# Patient Record
Sex: FEMALE | Race: ASIAN | NOT HISPANIC OR LATINO
[De-identification: names, ages, dates, MRNs, and addresses within clinical notes are randomized per-mention and may not be internally consistent; named-entity substitution may affect disease eponyms.]

---

## 2022-11-03 ENCOUNTER — NON-APPOINTMENT (OUTPATIENT)
Age: 27
End: 2022-11-03

## 2022-11-11 ENCOUNTER — ASOB RESULT (OUTPATIENT)
Age: 27
End: 2022-11-11

## 2022-11-11 ENCOUNTER — APPOINTMENT (OUTPATIENT)
Dept: ANTEPARTUM | Facility: CLINIC | Age: 27
End: 2022-11-11

## 2022-11-11 PROCEDURE — 76813 OB US NUCHAL MEAS 1 GEST: CPT

## 2022-11-11 PROCEDURE — 36416 COLLJ CAPILLARY BLOOD SPEC: CPT

## 2022-11-11 PROCEDURE — 76801 OB US < 14 WKS SINGLE FETUS: CPT

## 2022-11-15 ENCOUNTER — APPOINTMENT (OUTPATIENT)
Dept: ANTEPARTUM | Facility: CLINIC | Age: 27
End: 2022-11-15

## 2022-11-17 PROBLEM — Z00.00 ENCOUNTER FOR PREVENTIVE HEALTH EXAMINATION: Status: ACTIVE | Noted: 2022-11-17

## 2022-11-30 ENCOUNTER — TRANSCRIPTION ENCOUNTER (OUTPATIENT)
Age: 27
End: 2022-11-30

## 2022-12-27 ENCOUNTER — APPOINTMENT (OUTPATIENT)
Dept: ENDOCRINOLOGY | Facility: CLINIC | Age: 27
End: 2022-12-27

## 2023-01-10 ENCOUNTER — ASOB RESULT (OUTPATIENT)
Age: 28
End: 2023-01-10

## 2023-01-10 ENCOUNTER — APPOINTMENT (OUTPATIENT)
Dept: ANTEPARTUM | Facility: CLINIC | Age: 28
End: 2023-01-10
Payer: COMMERCIAL

## 2023-01-10 PROCEDURE — 76811 OB US DETAILED SNGL FETUS: CPT

## 2023-05-08 ENCOUNTER — OUTPATIENT (OUTPATIENT)
Dept: INPATIENT UNIT | Facility: HOSPITAL | Age: 28
LOS: 1 days | Discharge: HOME | End: 2023-05-08
Payer: COMMERCIAL

## 2023-05-08 VITALS — SYSTOLIC BLOOD PRESSURE: 96 MMHG | HEART RATE: 94 BPM | DIASTOLIC BLOOD PRESSURE: 66 MMHG

## 2023-05-08 VITALS — TEMPERATURE: 99 F

## 2023-05-08 DIAGNOSIS — O26.899 OTHER SPECIFIED PREGNANCY RELATED CONDITIONS, UNSPECIFIED TRIMESTER: ICD-10-CM

## 2023-05-08 PROCEDURE — 99221 1ST HOSP IP/OBS SF/LOW 40: CPT

## 2023-05-08 NOTE — OB PROVIDER TRIAGE NOTE - NS_LMP_OBGYN_ALL_OB_DT
18-Aug-2022
I personally performed the service described in the documentation recorded by the scribe in my presence, and it accurately and completely records my words and actions.

## 2023-05-08 NOTE — OB RN TRIAGE NOTE - CHIEF COMPLAINT QUOTE
s/p R hip IM nail, debridement of surgical site
Sent from the doctor's office for variable decelerations , nonreactive NST and prolonged monitoring

## 2023-05-08 NOTE — OB PROVIDER TRIAGE NOTE - HISTORY OF PRESENT ILLNESS
29 yo , EGA@37 4/7 weeks, presented to D&T from her OB office for prolong monitoring due to variables down to 95 BPM, denies vaginal bleeding, leakage of fluid, and reports positive fetal movement.  Denies fever, chills, headaches, changes in vision, chest pain, palpitations, shortness of breath, cough, nausea, vomiting, diarrhea, constipation, urinary symptoms, edema.    Prenatal care with Dr. Flores  Prenatal course is complicated liver enzimes  GBS status is negative.  Meds:  Allergies:       29 yo , EGA@37 4/7 weeks, presented to D&T from her OB office for prolong monitoring due to variables down to 95 BPM, denies vaginal bleeding, leakage of fluid, and reports positive fetal movement.  Denies fever, chills, headaches, changes in vision, chest pain, palpitations, shortness of breath, cough, nausea, vomiting, diarrhea, constipation, urinary symptoms, edema.    Prenatal care with Dr. Flores  Prenatal course is complicated liver enzymes  GBS status is negative as per patient  Meds: PNV  Allergies: NKDA

## 2023-05-08 NOTE — OB PROVIDER TRIAGE NOTE - NSHPPHYSICALEXAM_GEN_ALL_CORE
Vital Signs Last 24 Hrs  T(C): 37 (08 May 2023 19:47), Max: 37.0 (08 May 2023 19:26)  T(F): 98.6 (08 May 2023 19:47), Max: 98.6 (08 May 2023 19:26)  HR: 83 (08 May 2023 19:47) (83 - 83)  BP: 105/66 (08 May 2023 19:47) (105/66 - 105/66)  BP(mean): --  RR: 16 (08 May 2023 19:47) (16 - 16)  SpO2: --  Gen: NAD  Head: NC/AT  Cardio: S1S2+, RRR  Resp: CTABL, no wheezing  Abdomen: Soft, NT/ND, BS+  Extremities: No LE edema bilaterally    NST and BPP done to assess fetal surveillance and results as follows:  NST-->FHR: 120 HR baseline, moderate variability, accelerations present, no decelerations, Category 1.  [place]: Contractions present, irregular,  TAUS: Vital Signs Last 24 Hrs  T(C): 37 (08 May 2023 19:47), Max: 37.0 (08 May 2023 19:26)  T(F): 98.6 (08 May 2023 19:47), Max: 98.6 (08 May 2023 19:26)  HR: 83 (08 May 2023 19:47) (83 - 83)  BP: 105/66 (08 May 2023 19:47) (105/66 - 105/66)  BP(mean): --  RR: 16 (08 May 2023 19:47) (16 - 16)  SpO2: --  Gen: NAD  Head: NC/AT  Cardio: S1S2+, RRR  Resp: CTABL, no wheezing  Abdomen: Soft, NT/ND, BS+  Extremities: No LE edema bilaterally    NST and BPP done to assess fetal surveillance and results as follows:  NST-->FHR: 120 HR baseline, moderate variability, accelerations present, no decelerations, Category 1.  [place]: Contractions present, irregular,  TAUS: Cephalic presentation, Anterior placenta, LEYLA 14.41 BPP 8/8  VE: 0.5/30/-3

## 2023-05-08 NOTE — OB PROVIDER TRIAGE NOTE - ADDITIONAL INSTRUCTIONS
please drink 1-2 litter of fluid per day  follow up with OB for the end of this weeks, call for an appointment.

## 2023-05-08 NOTE — OB PROVIDER TRIAGE NOTE - NSOBPROVIDERNOTE_OBGYN_ALL_OB_FT
27 yo , EGA@37 4/7 weeks Prolong monitoring for variables.   tracing reviewed with Dr. Rosas PGY-3  2150 discuss with Dr. Mittal  Pt to keep self very well hydrated   stable for discharge  Discharge patient home.  Labor precautions and fetal movements count were reviewed; if not in labor, will follow up with OB the end of this weeks, call for an appointment.  Plan of care was reviewed with patient and family; patient states understanding of the above plan. 27 yo , EGA@37 4/7 weeks Prolong monitoring for variables.   tracing reviewed with Dr. Rosas PGY-3   discuss with Dr. Mittal  Pt to keep self very well hydrated   stable for discharge  Discharge patient home.  Labor precautions and fetal movements count were reviewed; if not in labor, will follow up with OB the end of this weeks, call for an appointment.  Plan of care was reviewed with patient and family; patient states understanding of the above plan.  Pt Discharged Home @ 4119

## 2023-05-10 DIAGNOSIS — R74.8 ABNORMAL LEVELS OF OTHER SERUM ENZYMES: ICD-10-CM

## 2023-05-10 DIAGNOSIS — O36.8330 MATERNAL CARE FOR ABNORMALITIES OF THE FETAL HEART RATE OR RHYTHM, THIRD TRIMESTER, NOT APPLICABLE OR UNSPECIFIED: ICD-10-CM

## 2023-05-10 DIAGNOSIS — Z3A.37 37 WEEKS GESTATION OF PREGNANCY: ICD-10-CM

## 2023-05-10 DIAGNOSIS — O26.613 LIVER AND BILIARY TRACT DISORDERS IN PREGNANCY, THIRD TRIMESTER: ICD-10-CM

## 2023-05-11 DIAGNOSIS — O26.893 OTHER SPECIFIED PREGNANCY RELATED CONDITIONS, THIRD TRIMESTER: ICD-10-CM

## 2023-05-15 ENCOUNTER — INPATIENT (INPATIENT)
Facility: HOSPITAL | Age: 28
LOS: 3 days | Discharge: HOME | End: 2023-05-19
Attending: OBSTETRICS & GYNECOLOGY | Admitting: OBSTETRICS & GYNECOLOGY

## 2023-05-15 VITALS
DIASTOLIC BLOOD PRESSURE: 68 MMHG | TEMPERATURE: 97 F | SYSTOLIC BLOOD PRESSURE: 107 MMHG | RESPIRATION RATE: 16 BRPM | WEIGHT: 130.07 LBS | HEIGHT: 62 IN | HEART RATE: 109 BPM

## 2023-05-15 DIAGNOSIS — O09.90 SUPERVISION OF HIGH RISK PREGNANCY, UNSPECIFIED, UNSPECIFIED TRIMESTER: ICD-10-CM

## 2023-05-15 PROBLEM — Z78.9 OTHER SPECIFIED HEALTH STATUS: Chronic | Status: ACTIVE | Noted: 2023-05-08

## 2023-05-15 LAB
ALBUMIN SERPL ELPH-MCNC: 3.4 G/DL — SIGNIFICANT CHANGE UP (ref 3.3–5)
ALP SERPL-CCNC: 193 U/L — HIGH (ref 40–120)
ALT FLD-CCNC: 112 U/L — HIGH (ref 4–33)
ANION GAP SERPL CALC-SCNC: 13 MMOL/L — SIGNIFICANT CHANGE UP (ref 7–14)
APTT BLD: 29.6 SEC — SIGNIFICANT CHANGE UP (ref 27–36.3)
AST SERPL-CCNC: 45 U/L — HIGH (ref 4–32)
BASOPHILS # BLD AUTO: 0.06 K/UL — SIGNIFICANT CHANGE UP (ref 0–0.2)
BASOPHILS NFR BLD AUTO: 0.6 % — SIGNIFICANT CHANGE UP (ref 0–2)
BILIRUB SERPL-MCNC: <0.2 MG/DL — SIGNIFICANT CHANGE UP (ref 0.2–1.2)
BLD GP AB SCN SERPL QL: NEGATIVE — SIGNIFICANT CHANGE UP
BUN SERPL-MCNC: 11 MG/DL — SIGNIFICANT CHANGE UP (ref 7–23)
CALCIUM SERPL-MCNC: 9.2 MG/DL — SIGNIFICANT CHANGE UP (ref 8.4–10.5)
CHLORIDE SERPL-SCNC: 103 MMOL/L — SIGNIFICANT CHANGE UP (ref 98–107)
CO2 SERPL-SCNC: 20 MMOL/L — LOW (ref 22–31)
COVID-19 SPIKE DOMAIN AB INTERP: POSITIVE
COVID-19 SPIKE DOMAIN ANTIBODY RESULT: >250 U/ML — HIGH
CREAT SERPL-MCNC: 0.61 MG/DL — SIGNIFICANT CHANGE UP (ref 0.5–1.3)
EGFR: 126 ML/MIN/1.73M2 — SIGNIFICANT CHANGE UP
EOSINOPHIL # BLD AUTO: 0.1 K/UL — SIGNIFICANT CHANGE UP (ref 0–0.5)
EOSINOPHIL NFR BLD AUTO: 1.1 % — SIGNIFICANT CHANGE UP (ref 0–6)
GLUCOSE SERPL-MCNC: 89 MG/DL — SIGNIFICANT CHANGE UP (ref 70–99)
HCT VFR BLD CALC: 36.9 % — SIGNIFICANT CHANGE UP (ref 34.5–45)
HGB BLD-MCNC: 12 G/DL — SIGNIFICANT CHANGE UP (ref 11.5–15.5)
IANC: 6.03 K/UL — SIGNIFICANT CHANGE UP (ref 1.8–7.4)
IMM GRANULOCYTES NFR BLD AUTO: 1.1 % — HIGH (ref 0–0.9)
INR BLD: <0.9 RATIO — LOW (ref 0.88–1.16)
LDH SERPL L TO P-CCNC: 194 U/L — SIGNIFICANT CHANGE UP (ref 135–225)
LYMPHOCYTES # BLD AUTO: 2.29 K/UL — SIGNIFICANT CHANGE UP (ref 1–3.3)
LYMPHOCYTES # BLD AUTO: 24.7 % — SIGNIFICANT CHANGE UP (ref 13–44)
MCHC RBC-ENTMCNC: 29.9 PG — SIGNIFICANT CHANGE UP (ref 27–34)
MCHC RBC-ENTMCNC: 32.5 GM/DL — SIGNIFICANT CHANGE UP (ref 32–36)
MCV RBC AUTO: 91.8 FL — SIGNIFICANT CHANGE UP (ref 80–100)
MONOCYTES # BLD AUTO: 0.69 K/UL — SIGNIFICANT CHANGE UP (ref 0–0.9)
MONOCYTES NFR BLD AUTO: 7.4 % — SIGNIFICANT CHANGE UP (ref 2–14)
NEUTROPHILS # BLD AUTO: 6.03 K/UL — SIGNIFICANT CHANGE UP (ref 1.8–7.4)
NEUTROPHILS NFR BLD AUTO: 65.1 % — SIGNIFICANT CHANGE UP (ref 43–77)
NRBC # BLD: 0 /100 WBCS — SIGNIFICANT CHANGE UP (ref 0–0)
NRBC # FLD: 0 K/UL — SIGNIFICANT CHANGE UP (ref 0–0)
PLATELET # BLD AUTO: 210 K/UL — SIGNIFICANT CHANGE UP (ref 150–400)
POTASSIUM SERPL-MCNC: 4.1 MMOL/L — SIGNIFICANT CHANGE UP (ref 3.5–5.3)
POTASSIUM SERPL-SCNC: 4.1 MMOL/L — SIGNIFICANT CHANGE UP (ref 3.5–5.3)
PROT SERPL-MCNC: 6.2 G/DL — SIGNIFICANT CHANGE UP (ref 6–8.3)
PROTHROM AB SERPL-ACNC: 9.8 SEC — LOW (ref 10.5–13.4)
RBC # BLD: 4.02 M/UL — SIGNIFICANT CHANGE UP (ref 3.8–5.2)
RBC # FLD: 13 % — SIGNIFICANT CHANGE UP (ref 10.3–14.5)
RH IG SCN BLD-IMP: POSITIVE — SIGNIFICANT CHANGE UP
RH IG SCN BLD-IMP: POSITIVE — SIGNIFICANT CHANGE UP
SARS-COV-2 IGG+IGM SERPL QL IA: >250 U/ML — HIGH
SARS-COV-2 IGG+IGM SERPL QL IA: POSITIVE
SODIUM SERPL-SCNC: 136 MMOL/L — SIGNIFICANT CHANGE UP (ref 135–145)
T PALLIDUM AB TITR SER: NEGATIVE — SIGNIFICANT CHANGE UP
URATE SERPL-MCNC: 8.5 MG/DL — HIGH (ref 2.5–7)
WBC # BLD: 9.27 K/UL — SIGNIFICANT CHANGE UP (ref 3.8–10.5)
WBC # FLD AUTO: 9.27 K/UL — SIGNIFICANT CHANGE UP (ref 3.8–10.5)

## 2023-05-15 RX ORDER — SODIUM CHLORIDE 9 MG/ML
1000 INJECTION, SOLUTION INTRAVENOUS ONCE
Refills: 0 | Status: DISCONTINUED | OUTPATIENT
Start: 2023-05-15 | End: 2023-05-16

## 2023-05-15 RX ORDER — OXYTOCIN 10 UNIT/ML
333.33 VIAL (ML) INJECTION
Qty: 20 | Refills: 0 | Status: COMPLETED | OUTPATIENT
Start: 2023-05-15 | End: 2023-05-15

## 2023-05-15 RX ORDER — SODIUM CHLORIDE 9 MG/ML
1000 INJECTION, SOLUTION INTRAVENOUS
Refills: 0 | Status: DISCONTINUED | OUTPATIENT
Start: 2023-05-15 | End: 2023-05-15

## 2023-05-15 RX ORDER — SODIUM CHLORIDE 9 MG/ML
1000 INJECTION, SOLUTION INTRAVENOUS
Refills: 0 | Status: DISCONTINUED | OUTPATIENT
Start: 2023-05-15 | End: 2023-05-16

## 2023-05-15 RX ORDER — CHLORHEXIDINE GLUCONATE 213 G/1000ML
1 SOLUTION TOPICAL DAILY
Refills: 0 | Status: DISCONTINUED | OUTPATIENT
Start: 2023-05-15 | End: 2023-05-16

## 2023-05-15 RX ORDER — CHLORHEXIDINE GLUCONATE 213 G/1000ML
1 SOLUTION TOPICAL DAILY
Refills: 0 | Status: DISCONTINUED | OUTPATIENT
Start: 2023-05-15 | End: 2023-05-15

## 2023-05-15 RX ORDER — SODIUM CHLORIDE 9 MG/ML
1000 INJECTION, SOLUTION INTRAVENOUS ONCE
Refills: 0 | Status: DISCONTINUED | OUTPATIENT
Start: 2023-05-15 | End: 2023-05-15

## 2023-05-15 RX ADMIN — CHLORHEXIDINE GLUCONATE 1 APPLICATION(S): 213 SOLUTION TOPICAL at 10:50

## 2023-05-15 RX ADMIN — SODIUM CHLORIDE 125 MILLILITER(S): 9 INJECTION, SOLUTION INTRAVENOUS at 11:31

## 2023-05-15 NOTE — OB PROVIDER H&P - ASSESSMENT
A&P: This is a 29 y/o  at 38w4d gestation presenting for IOL due to Elevated Liver Enzymes of unknown etiology. Pregnancy otherwise uncomplicated.     -Admit to L&D  -Buccal cytotec, Cervical balloon when able and if patient will tolerate   -routine labs w/ HELLP labs   -EFM/toco  -NPO, IV hydration   Fetal: cat 1 tracing, fetal status reassuring   GBS: neg  Analgesia: Patient is open to epidural when needed.       Discussed with Dr. Sandra Retana PGY-2 A&P: This is a 27 y/o  at 38w3d gestation presenting for IOL due to Elevated Liver Enzymes of unknown etiology. Pregnancy otherwise uncomplicated.     -Admit to L&D  -Buccal cytotec, Cervical balloon when able and if patient will tolerate   -routine labs w/ HELLP labs   -EFM/toco  -NPO, IV hydration   Fetal: cat 1 tracing, fetal status reassuring   GBS: neg  Analgesia: Patient is open to epidural when needed.       Discussed with Dr. Sandra Retana PGY-2

## 2023-05-15 NOTE — OB RN PATIENT PROFILE - NS TRANSFER PATIENT BELONGINGS
Seizure precautions  Discussed febrile seizures and safety precautions with mother   Monitor fever curve     Cell Phone/PDA (specify)/Jewelry/Money (specify)/Clothing

## 2023-05-15 NOTE — OB PROVIDER H&P - HISTORY OF PRESENT ILLNESS
Pt is a 27 y/o  at 38w4d gestation being admitted for IOL due to elevated Liver enzymes of unknown etiology. Per MFM, Dr. Duarte patient is to undergo IOL.  Patient reports +FM, no LOF, No vaginal bleeding. Denies any chest pain, shortness of breath, visual changes, pruritis or headaches.   Prenatal course: Pregnancy complicated by Elevated LFTs of unknown etiology. Patient did have an abnormal FHT noted in triage last week with singular episode of fetal bradycardia.   GBS: Neg   EFW: 3100g (gross estimation based on previous viewable sonogram)     OBHx: Denies any history   GynHx: denies h/o abnormal paps, STI's, fibroids, cysts, No previous history of GYN surgeries   PMHx: Denies   PSHx: Denies   Med: Daily Prenatal vitamin   All: NKDA  SH: denies alcohol, tobacco, or drug use  Psych: denies h/o anxiety or depression      EFH: 120 bpm baseline HR, Moderate Variability, +Accels, no decelerations   [place]: Irregular Contractions   VE:0/0/-3   TAUS: Cephalic, Placenta posterior      Pt is a 29 y/o  at 38w3d gestation being admitted for IOL due to elevated Liver enzymes of unknown etiology. Per MFM, Dr. Duarte patient is to undergo IOL.  Patient reports +FM, no LOF, No vaginal bleeding. Denies any chest pain, shortness of breath, visual changes, pruritis or headaches.   Prenatal course: Pregnancy complicated by Elevated LFTs of unknown etiology. Patient did have an abnormal FHT noted in triage last week with singular episode of fetal bradycardia.   GBS: Neg   EFW: 3100g (gross estimation based on previous viewable sonogram)     OBHx: Denies any history   GynHx: denies h/o abnormal paps, STI's, fibroids, cysts, No previous history of GYN surgeries   PMHx: Denies   PSHx: Denies   Med: Daily Prenatal vitamin   All: NKDA  SH: denies alcohol, tobacco, or drug use  Psych: denies h/o anxiety or depression      EFH: 120 bpm baseline HR, Moderate Variability, +Accels, no decelerations   [place]: Irregular Contractions   VE:0/0/-3   TAUS: Cephalic, Placenta posterior

## 2023-05-16 ENCOUNTER — TRANSCRIPTION ENCOUNTER (OUTPATIENT)
Age: 28
End: 2023-05-16

## 2023-05-16 LAB
ALBUMIN SERPL ELPH-MCNC: 3.2 G/DL — LOW (ref 3.3–5)
ALBUMIN SERPL ELPH-MCNC: 3.3 G/DL — SIGNIFICANT CHANGE UP (ref 3.3–5)
ALP SERPL-CCNC: 195 U/L — HIGH (ref 40–120)
ALP SERPL-CCNC: 197 U/L — HIGH (ref 40–120)
ALT FLD-CCNC: 103 U/L — HIGH (ref 4–33)
ALT FLD-CCNC: 97 U/L — HIGH (ref 4–33)
ANION GAP SERPL CALC-SCNC: 14 MMOL/L — SIGNIFICANT CHANGE UP (ref 7–14)
ANION GAP SERPL CALC-SCNC: 14 MMOL/L — SIGNIFICANT CHANGE UP (ref 7–14)
APPEARANCE UR: CLEAR — SIGNIFICANT CHANGE UP
APTT BLD: 27.1 SEC — SIGNIFICANT CHANGE UP (ref 27–36.3)
APTT BLD: 28.2 SEC — SIGNIFICANT CHANGE UP (ref 27–36.3)
APTT BLD: 28.6 SEC — SIGNIFICANT CHANGE UP (ref 27–36.3)
AST SERPL-CCNC: 42 U/L — HIGH (ref 4–32)
AST SERPL-CCNC: 43 U/L — HIGH (ref 4–32)
BASOPHILS # BLD AUTO: 0.04 K/UL — SIGNIFICANT CHANGE UP (ref 0–0.2)
BASOPHILS NFR BLD AUTO: 0.2 % — SIGNIFICANT CHANGE UP (ref 0–2)
BASOPHILS NFR BLD AUTO: 0.3 % — SIGNIFICANT CHANGE UP (ref 0–2)
BASOPHILS NFR BLD AUTO: 0.4 % — SIGNIFICANT CHANGE UP (ref 0–2)
BILIRUB SERPL-MCNC: 0.3 MG/DL — SIGNIFICANT CHANGE UP (ref 0.2–1.2)
BILIRUB SERPL-MCNC: 0.3 MG/DL — SIGNIFICANT CHANGE UP (ref 0.2–1.2)
BILIRUB UR-MCNC: NEGATIVE — SIGNIFICANT CHANGE UP
BUN SERPL-MCNC: 7 MG/DL — SIGNIFICANT CHANGE UP (ref 7–23)
BUN SERPL-MCNC: 8 MG/DL — SIGNIFICANT CHANGE UP (ref 7–23)
CALCIUM SERPL-MCNC: 9.2 MG/DL — SIGNIFICANT CHANGE UP (ref 8.4–10.5)
CALCIUM SERPL-MCNC: 9.2 MG/DL — SIGNIFICANT CHANGE UP (ref 8.4–10.5)
CHLORIDE SERPL-SCNC: 106 MMOL/L — SIGNIFICANT CHANGE UP (ref 98–107)
CHLORIDE SERPL-SCNC: 107 MMOL/L — SIGNIFICANT CHANGE UP (ref 98–107)
CO2 SERPL-SCNC: 17 MMOL/L — LOW (ref 22–31)
CO2 SERPL-SCNC: 19 MMOL/L — LOW (ref 22–31)
COLOR SPEC: SIGNIFICANT CHANGE UP
CREAT ?TM UR-MCNC: 34 MG/DL — SIGNIFICANT CHANGE UP
CREAT SERPL-MCNC: 0.66 MG/DL — SIGNIFICANT CHANGE UP (ref 0.5–1.3)
CREAT SERPL-MCNC: 0.72 MG/DL — SIGNIFICANT CHANGE UP (ref 0.5–1.3)
DIFF PNL FLD: ABNORMAL
EGFR: 117 ML/MIN/1.73M2 — SIGNIFICANT CHANGE UP
EGFR: 123 ML/MIN/1.73M2 — SIGNIFICANT CHANGE UP
EOSINOPHIL # BLD AUTO: 0.01 K/UL — SIGNIFICANT CHANGE UP (ref 0–0.5)
EOSINOPHIL # BLD AUTO: 0.02 K/UL — SIGNIFICANT CHANGE UP (ref 0–0.5)
EOSINOPHIL # BLD AUTO: 0.11 K/UL — SIGNIFICANT CHANGE UP (ref 0–0.5)
EOSINOPHIL NFR BLD AUTO: 0.1 % — SIGNIFICANT CHANGE UP (ref 0–6)
EOSINOPHIL NFR BLD AUTO: 0.1 % — SIGNIFICANT CHANGE UP (ref 0–6)
EOSINOPHIL NFR BLD AUTO: 1 % — SIGNIFICANT CHANGE UP (ref 0–6)
FIBRINOGEN PPP-MCNC: 455 MG/DL — SIGNIFICANT CHANGE UP (ref 200–465)
FIBRINOGEN PPP-MCNC: 518 MG/DL — HIGH (ref 200–465)
GLUCOSE SERPL-MCNC: 69 MG/DL — LOW (ref 70–99)
GLUCOSE SERPL-MCNC: 75 MG/DL — SIGNIFICANT CHANGE UP (ref 70–99)
GLUCOSE UR QL: NEGATIVE — SIGNIFICANT CHANGE UP
HCT VFR BLD CALC: 34.9 % — SIGNIFICANT CHANGE UP (ref 34.5–45)
HCT VFR BLD CALC: 38.3 % — SIGNIFICANT CHANGE UP (ref 34.5–45)
HCT VFR BLD CALC: 40.2 % — SIGNIFICANT CHANGE UP (ref 34.5–45)
HGB BLD-MCNC: 11.4 G/DL — LOW (ref 11.5–15.5)
HGB BLD-MCNC: 12.5 G/DL — SIGNIFICANT CHANGE UP (ref 11.5–15.5)
HGB BLD-MCNC: 13.2 G/DL — SIGNIFICANT CHANGE UP (ref 11.5–15.5)
IANC: 11.42 K/UL — HIGH (ref 1.8–7.4)
IANC: 12.84 K/UL — HIGH (ref 1.8–7.4)
IANC: 7.22 K/UL — SIGNIFICANT CHANGE UP (ref 1.8–7.4)
IMM GRANULOCYTES NFR BLD AUTO: 0.4 % — SIGNIFICANT CHANGE UP (ref 0–0.9)
IMM GRANULOCYTES NFR BLD AUTO: 0.4 % — SIGNIFICANT CHANGE UP (ref 0–0.9)
IMM GRANULOCYTES NFR BLD AUTO: 0.8 % — SIGNIFICANT CHANGE UP (ref 0–0.9)
INR BLD: <0.9 RATIO — SIGNIFICANT CHANGE UP (ref 0.88–1.16)
INR BLD: <0.9 RATIO — SIGNIFICANT CHANGE UP (ref 0.88–1.16)
KETONES UR-MCNC: ABNORMAL
LDH SERPL L TO P-CCNC: 182 U/L — SIGNIFICANT CHANGE UP (ref 135–225)
LDH SERPL L TO P-CCNC: 189 U/L — SIGNIFICANT CHANGE UP (ref 135–225)
LEUKOCYTE ESTERASE UR-ACNC: NEGATIVE — SIGNIFICANT CHANGE UP
LYMPHOCYTES # BLD AUTO: 14.2 % — SIGNIFICANT CHANGE UP (ref 13–44)
LYMPHOCYTES # BLD AUTO: 14.9 % — SIGNIFICANT CHANGE UP (ref 13–44)
LYMPHOCYTES # BLD AUTO: 2.18 K/UL — SIGNIFICANT CHANGE UP (ref 1–3.3)
LYMPHOCYTES # BLD AUTO: 2.3 K/UL — SIGNIFICANT CHANGE UP (ref 1–3.3)
LYMPHOCYTES # BLD AUTO: 2.58 K/UL — SIGNIFICANT CHANGE UP (ref 1–3.3)
LYMPHOCYTES # BLD AUTO: 23.9 % — SIGNIFICANT CHANGE UP (ref 13–44)
MAGNESIUM SERPL-MCNC: 5.1 MG/DL — HIGH (ref 1.6–2.6)
MCHC RBC-ENTMCNC: 29.9 PG — SIGNIFICANT CHANGE UP (ref 27–34)
MCHC RBC-ENTMCNC: 30.8 PG — SIGNIFICANT CHANGE UP (ref 27–34)
MCHC RBC-ENTMCNC: 31.2 PG — SIGNIFICANT CHANGE UP (ref 27–34)
MCHC RBC-ENTMCNC: 32.6 GM/DL — SIGNIFICANT CHANGE UP (ref 32–36)
MCHC RBC-ENTMCNC: 32.7 GM/DL — SIGNIFICANT CHANGE UP (ref 32–36)
MCHC RBC-ENTMCNC: 32.8 GM/DL — SIGNIFICANT CHANGE UP (ref 32–36)
MCV RBC AUTO: 91.6 FL — SIGNIFICANT CHANGE UP (ref 80–100)
MCV RBC AUTO: 94.3 FL — SIGNIFICANT CHANGE UP (ref 80–100)
MCV RBC AUTO: 95 FL — SIGNIFICANT CHANGE UP (ref 80–100)
MONOCYTES # BLD AUTO: 0.8 K/UL — SIGNIFICANT CHANGE UP (ref 0–0.9)
MONOCYTES # BLD AUTO: 0.86 K/UL — SIGNIFICANT CHANGE UP (ref 0–0.9)
MONOCYTES # BLD AUTO: 0.9 K/UL — SIGNIFICANT CHANGE UP (ref 0–0.9)
MONOCYTES NFR BLD AUTO: 5.3 % — SIGNIFICANT CHANGE UP (ref 2–14)
MONOCYTES NFR BLD AUTO: 6.2 % — SIGNIFICANT CHANGE UP (ref 2–14)
MONOCYTES NFR BLD AUTO: 7.4 % — SIGNIFICANT CHANGE UP (ref 2–14)
NEUTROPHILS # BLD AUTO: 11.42 K/UL — HIGH (ref 1.8–7.4)
NEUTROPHILS # BLD AUTO: 12.84 K/UL — HIGH (ref 1.8–7.4)
NEUTROPHILS # BLD AUTO: 7.22 K/UL — SIGNIFICANT CHANGE UP (ref 1.8–7.4)
NEUTROPHILS NFR BLD AUTO: 66.9 % — SIGNIFICANT CHANGE UP (ref 43–77)
NEUTROPHILS NFR BLD AUTO: 78.1 % — HIGH (ref 43–77)
NEUTROPHILS NFR BLD AUTO: 79.4 % — HIGH (ref 43–77)
NITRITE UR-MCNC: NEGATIVE — SIGNIFICANT CHANGE UP
NRBC # BLD: 0 /100 WBCS — SIGNIFICANT CHANGE UP (ref 0–0)
NRBC # FLD: 0 K/UL — SIGNIFICANT CHANGE UP (ref 0–0)
PH UR: 6.5 — SIGNIFICANT CHANGE UP (ref 5–8)
PLATELET # BLD AUTO: 178 K/UL — SIGNIFICANT CHANGE UP (ref 150–400)
PLATELET # BLD AUTO: 194 K/UL — SIGNIFICANT CHANGE UP (ref 150–400)
PLATELET # BLD AUTO: 201 K/UL — SIGNIFICANT CHANGE UP (ref 150–400)
POTASSIUM SERPL-MCNC: 3.9 MMOL/L — SIGNIFICANT CHANGE UP (ref 3.5–5.3)
POTASSIUM SERPL-MCNC: 4 MMOL/L — SIGNIFICANT CHANGE UP (ref 3.5–5.3)
POTASSIUM SERPL-SCNC: 3.9 MMOL/L — SIGNIFICANT CHANGE UP (ref 3.5–5.3)
POTASSIUM SERPL-SCNC: 4 MMOL/L — SIGNIFICANT CHANGE UP (ref 3.5–5.3)
PROT ?TM UR-MCNC: 8 MG/DL — SIGNIFICANT CHANGE UP
PROT ?TM UR-MCNC: 8 MG/DL — SIGNIFICANT CHANGE UP
PROT SERPL-MCNC: 6 G/DL — SIGNIFICANT CHANGE UP (ref 6–8.3)
PROT SERPL-MCNC: 6.3 G/DL — SIGNIFICANT CHANGE UP (ref 6–8.3)
PROT UR-MCNC: NEGATIVE — SIGNIFICANT CHANGE UP
PROT/CREAT UR-RTO: 0.2 RATIO — SIGNIFICANT CHANGE UP (ref 0–0.2)
PROTHROM AB SERPL-ACNC: 10.2 SEC — LOW (ref 10.5–13.4)
PROTHROM AB SERPL-ACNC: 10.3 SEC — LOW (ref 10.5–13.4)
RBC # BLD: 3.81 M/UL — SIGNIFICANT CHANGE UP (ref 3.8–5.2)
RBC # BLD: 4.06 M/UL — SIGNIFICANT CHANGE UP (ref 3.8–5.2)
RBC # BLD: 4.23 M/UL — SIGNIFICANT CHANGE UP (ref 3.8–5.2)
RBC # FLD: 13 % — SIGNIFICANT CHANGE UP (ref 10.3–14.5)
RBC # FLD: 13 % — SIGNIFICANT CHANGE UP (ref 10.3–14.5)
RBC # FLD: 13.2 % — SIGNIFICANT CHANGE UP (ref 10.3–14.5)
SODIUM SERPL-SCNC: 138 MMOL/L — SIGNIFICANT CHANGE UP (ref 135–145)
SODIUM SERPL-SCNC: 139 MMOL/L — SIGNIFICANT CHANGE UP (ref 135–145)
SP GR SPEC: 1.01 — LOW (ref 1.01–1.05)
URATE SERPL-MCNC: 8.1 MG/DL — HIGH (ref 2.5–7)
URATE SERPL-MCNC: 8.1 MG/DL — HIGH (ref 2.5–7)
URATE SERPL-MCNC: 8.2 MG/DL — HIGH (ref 2.5–7)
UROBILINOGEN FLD QL: SIGNIFICANT CHANGE UP
WBC # BLD: 10.79 K/UL — HIGH (ref 3.8–10.5)
WBC # BLD: 14.62 K/UL — HIGH (ref 3.8–10.5)
WBC # BLD: 16.18 K/UL — HIGH (ref 3.8–10.5)
WBC # FLD AUTO: 10.79 K/UL — HIGH (ref 3.8–10.5)
WBC # FLD AUTO: 14.62 K/UL — HIGH (ref 3.8–10.5)
WBC # FLD AUTO: 16.18 K/UL — HIGH (ref 3.8–10.5)

## 2023-05-16 RX ORDER — PRAMOXINE HYDROCHLORIDE 150 MG/15G
1 AEROSOL, FOAM RECTAL EVERY 4 HOURS
Refills: 0 | Status: DISCONTINUED | OUTPATIENT
Start: 2023-05-16 | End: 2023-05-19

## 2023-05-16 RX ORDER — MAGNESIUM SULFATE 500 MG/ML
2 VIAL (ML) INJECTION
Qty: 40 | Refills: 0 | Status: DISCONTINUED | OUTPATIENT
Start: 2023-05-16 | End: 2023-05-16

## 2023-05-16 RX ORDER — ACETAMINOPHEN 500 MG
975 TABLET ORAL
Refills: 0 | Status: DISCONTINUED | OUTPATIENT
Start: 2023-05-16 | End: 2023-05-19

## 2023-05-16 RX ORDER — DIBUCAINE 1 %
1 OINTMENT (GRAM) RECTAL
Qty: 0 | Refills: 0 | DISCHARGE
Start: 2023-05-16

## 2023-05-16 RX ORDER — IBUPROFEN 200 MG
1 TABLET ORAL
Qty: 0 | Refills: 0 | DISCHARGE
Start: 2023-05-16

## 2023-05-16 RX ORDER — MAGNESIUM HYDROXIDE 400 MG/1
30 TABLET, CHEWABLE ORAL
Refills: 0 | Status: DISCONTINUED | OUTPATIENT
Start: 2023-05-16 | End: 2023-05-16

## 2023-05-16 RX ORDER — HYDRALAZINE HCL 50 MG
5 TABLET ORAL ONCE
Refills: 0 | Status: COMPLETED | OUTPATIENT
Start: 2023-05-16 | End: 2023-05-16

## 2023-05-16 RX ORDER — BENZOCAINE 10 %
1 GEL (GRAM) MUCOUS MEMBRANE EVERY 6 HOURS
Refills: 0 | Status: DISCONTINUED | OUTPATIENT
Start: 2023-05-16 | End: 2023-05-16

## 2023-05-16 RX ORDER — SODIUM CHLORIDE 9 MG/ML
300 INJECTION, SOLUTION INTRAVENOUS ONCE
Refills: 0 | Status: COMPLETED | OUTPATIENT
Start: 2023-05-16 | End: 2023-05-16

## 2023-05-16 RX ORDER — OXYTOCIN 10 UNIT/ML
2 VIAL (ML) INJECTION
Qty: 30 | Refills: 0 | Status: DISCONTINUED | OUTPATIENT
Start: 2023-05-16 | End: 2023-05-18

## 2023-05-16 RX ORDER — OXYTOCIN 10 UNIT/ML
16.67 VIAL (ML) INJECTION
Qty: 20 | Refills: 0 | Status: DISCONTINUED | OUTPATIENT
Start: 2023-05-16 | End: 2023-05-18

## 2023-05-16 RX ORDER — NIFEDIPINE 30 MG
30 TABLET, EXTENDED RELEASE 24 HR ORAL DAILY
Refills: 0 | Status: DISCONTINUED | OUTPATIENT
Start: 2023-05-16 | End: 2023-05-19

## 2023-05-16 RX ORDER — DIPHENHYDRAMINE HCL 50 MG
25 CAPSULE ORAL EVERY 6 HOURS
Refills: 0 | Status: DISCONTINUED | OUTPATIENT
Start: 2023-05-16 | End: 2023-05-16

## 2023-05-16 RX ORDER — OXYTOCIN 10 UNIT/ML
41.67 VIAL (ML) INJECTION
Qty: 20 | Refills: 0 | Status: DISCONTINUED | OUTPATIENT
Start: 2023-05-16 | End: 2023-05-16

## 2023-05-16 RX ORDER — AER TRAVELER 0.5 G/1
1 SOLUTION RECTAL; TOPICAL EVERY 4 HOURS
Refills: 0 | Status: DISCONTINUED | OUTPATIENT
Start: 2023-05-16 | End: 2023-05-16

## 2023-05-16 RX ORDER — IBUPROFEN 200 MG
600 TABLET ORAL EVERY 6 HOURS
Refills: 0 | Status: COMPLETED | OUTPATIENT
Start: 2023-05-16 | End: 2024-04-13

## 2023-05-16 RX ORDER — NIFEDIPINE 30 MG
10 TABLET, EXTENDED RELEASE 24 HR ORAL ONCE
Refills: 0 | Status: DISCONTINUED | OUTPATIENT
Start: 2023-05-16 | End: 2023-05-16

## 2023-05-16 RX ORDER — BENZOCAINE 10 %
1 GEL (GRAM) MUCOUS MEMBRANE EVERY 6 HOURS
Refills: 0 | Status: DISCONTINUED | OUTPATIENT
Start: 2023-05-16 | End: 2023-05-19

## 2023-05-16 RX ORDER — OXYCODONE HYDROCHLORIDE 5 MG/1
5 TABLET ORAL ONCE
Refills: 0 | Status: DISCONTINUED | OUTPATIENT
Start: 2023-05-16 | End: 2023-05-19

## 2023-05-16 RX ORDER — TETANUS TOXOID, REDUCED DIPHTHERIA TOXOID AND ACELLULAR PERTUSSIS VACCINE, ADSORBED 5; 2.5; 8; 8; 2.5 [IU]/.5ML; [IU]/.5ML; UG/.5ML; UG/.5ML; UG/.5ML
0.5 SUSPENSION INTRAMUSCULAR ONCE
Refills: 0 | Status: DISCONTINUED | OUTPATIENT
Start: 2023-05-16 | End: 2023-05-16

## 2023-05-16 RX ORDER — MAGNESIUM SULFATE 500 MG/ML
1 VIAL (ML) INJECTION
Qty: 40 | Refills: 0 | Status: DISCONTINUED | OUTPATIENT
Start: 2023-05-16 | End: 2023-05-17

## 2023-05-16 RX ORDER — DIBUCAINE 1 %
1 OINTMENT (GRAM) RECTAL EVERY 6 HOURS
Refills: 0 | Status: DISCONTINUED | OUTPATIENT
Start: 2023-05-16 | End: 2023-05-19

## 2023-05-16 RX ORDER — HYDROCORTISONE 1 %
1 OINTMENT (GRAM) TOPICAL EVERY 6 HOURS
Refills: 0 | Status: DISCONTINUED | OUTPATIENT
Start: 2023-05-16 | End: 2023-05-16

## 2023-05-16 RX ORDER — SIMETHICONE 80 MG/1
80 TABLET, CHEWABLE ORAL EVERY 4 HOURS
Refills: 0 | Status: DISCONTINUED | OUTPATIENT
Start: 2023-05-16 | End: 2023-05-16

## 2023-05-16 RX ORDER — SIMETHICONE 80 MG/1
80 TABLET, CHEWABLE ORAL EVERY 4 HOURS
Refills: 0 | Status: DISCONTINUED | OUTPATIENT
Start: 2023-05-16 | End: 2023-05-19

## 2023-05-16 RX ORDER — AER TRAVELER 0.5 G/1
1 SOLUTION RECTAL; TOPICAL EVERY 4 HOURS
Refills: 0 | Status: DISCONTINUED | OUTPATIENT
Start: 2023-05-16 | End: 2023-05-19

## 2023-05-16 RX ORDER — SODIUM CHLORIDE 9 MG/ML
3 INJECTION INTRAMUSCULAR; INTRAVENOUS; SUBCUTANEOUS EVERY 8 HOURS
Refills: 0 | Status: DISCONTINUED | OUTPATIENT
Start: 2023-05-16 | End: 2023-05-19

## 2023-05-16 RX ORDER — LANOLIN
1 OINTMENT (GRAM) TOPICAL EVERY 6 HOURS
Refills: 0 | Status: DISCONTINUED | OUTPATIENT
Start: 2023-05-16 | End: 2023-05-16

## 2023-05-16 RX ORDER — HYDROCORTISONE 1 %
1 OINTMENT (GRAM) TOPICAL EVERY 6 HOURS
Refills: 0 | Status: DISCONTINUED | OUTPATIENT
Start: 2023-05-16 | End: 2023-05-19

## 2023-05-16 RX ORDER — MAGNESIUM HYDROXIDE 400 MG/1
30 TABLET, CHEWABLE ORAL
Refills: 0 | Status: DISCONTINUED | OUTPATIENT
Start: 2023-05-16 | End: 2023-05-19

## 2023-05-16 RX ORDER — KETOROLAC TROMETHAMINE 30 MG/ML
30 SYRINGE (ML) INJECTION ONCE
Refills: 0 | Status: DISCONTINUED | OUTPATIENT
Start: 2023-05-16 | End: 2023-05-16

## 2023-05-16 RX ORDER — DIPHENHYDRAMINE HCL 50 MG
25 CAPSULE ORAL EVERY 6 HOURS
Refills: 0 | Status: DISCONTINUED | OUTPATIENT
Start: 2023-05-16 | End: 2023-05-19

## 2023-05-16 RX ORDER — AER TRAVELER 0.5 G/1
1 SOLUTION RECTAL; TOPICAL
Qty: 0 | Refills: 0 | DISCHARGE
Start: 2023-05-16

## 2023-05-16 RX ORDER — SODIUM CHLORIDE 9 MG/ML
3 INJECTION INTRAMUSCULAR; INTRAVENOUS; SUBCUTANEOUS EVERY 8 HOURS
Refills: 0 | Status: DISCONTINUED | OUTPATIENT
Start: 2023-05-16 | End: 2023-05-16

## 2023-05-16 RX ORDER — ACETAMINOPHEN 500 MG
3 TABLET ORAL
Qty: 0 | Refills: 0 | DISCHARGE
Start: 2023-05-16

## 2023-05-16 RX ORDER — SODIUM CHLORIDE 9 MG/ML
1000 INJECTION, SOLUTION INTRAVENOUS
Refills: 0 | Status: DISCONTINUED | OUTPATIENT
Start: 2023-05-16 | End: 2023-05-18

## 2023-05-16 RX ORDER — LANOLIN
1 OINTMENT (GRAM) TOPICAL EVERY 6 HOURS
Refills: 0 | Status: DISCONTINUED | OUTPATIENT
Start: 2023-05-16 | End: 2023-05-19

## 2023-05-16 RX ORDER — PRAMOXINE HYDROCHLORIDE 150 MG/15G
1 AEROSOL, FOAM RECTAL EVERY 4 HOURS
Refills: 0 | Status: DISCONTINUED | OUTPATIENT
Start: 2023-05-16 | End: 2023-05-16

## 2023-05-16 RX ORDER — DIBUCAINE 1 %
1 OINTMENT (GRAM) RECTAL EVERY 6 HOURS
Refills: 0 | Status: DISCONTINUED | OUTPATIENT
Start: 2023-05-16 | End: 2023-05-16

## 2023-05-16 RX ORDER — OXYCODONE HYDROCHLORIDE 5 MG/1
5 TABLET ORAL
Refills: 0 | Status: DISCONTINUED | OUTPATIENT
Start: 2023-05-16 | End: 2023-05-19

## 2023-05-16 RX ORDER — MAGNESIUM SULFATE 500 MG/ML
4 VIAL (ML) INJECTION ONCE
Refills: 0 | Status: COMPLETED | OUTPATIENT
Start: 2023-05-16 | End: 2023-05-16

## 2023-05-16 RX ORDER — TETANUS TOXOID, REDUCED DIPHTHERIA TOXOID AND ACELLULAR PERTUSSIS VACCINE, ADSORBED 5; 2.5; 8; 8; 2.5 [IU]/.5ML; [IU]/.5ML; UG/.5ML; UG/.5ML; UG/.5ML
0.5 SUSPENSION INTRAMUSCULAR ONCE
Refills: 0 | Status: DISCONTINUED | OUTPATIENT
Start: 2023-05-16 | End: 2023-05-19

## 2023-05-16 RX ADMIN — Medication 125 MILLIUNIT(S)/MIN: at 12:31

## 2023-05-16 RX ADMIN — SODIUM CHLORIDE 75 MILLILITER(S): 9 INJECTION, SOLUTION INTRAVENOUS at 17:46

## 2023-05-16 RX ADMIN — SODIUM CHLORIDE 50 MILLILITER(S): 9 INJECTION, SOLUTION INTRAVENOUS at 16:31

## 2023-05-16 RX ADMIN — Medication 30 MILLIGRAM(S): at 15:43

## 2023-05-16 RX ADMIN — Medication 25 GM/HR: at 17:46

## 2023-05-16 RX ADMIN — Medication 975 MILLIGRAM(S): at 14:07

## 2023-05-16 RX ADMIN — Medication 5 MILLIGRAM(S): at 14:30

## 2023-05-16 RX ADMIN — Medication 50 MILLIUNIT(S)/MIN: at 14:24

## 2023-05-16 RX ADMIN — SODIUM CHLORIDE 3 MILLILITER(S): 9 INJECTION INTRAMUSCULAR; INTRAVENOUS; SUBCUTANEOUS at 21:54

## 2023-05-16 RX ADMIN — Medication 25 GM/HR: at 19:10

## 2023-05-16 RX ADMIN — SODIUM CHLORIDE 125 MILLILITER(S): 9 INJECTION, SOLUTION INTRAVENOUS at 07:08

## 2023-05-16 RX ADMIN — Medication 30 MILLIGRAM(S): at 14:10

## 2023-05-16 RX ADMIN — SODIUM CHLORIDE 3 MILLILITER(S): 9 INJECTION INTRAMUSCULAR; INTRAVENOUS; SUBCUTANEOUS at 14:30

## 2023-05-16 RX ADMIN — Medication 25 GM/HR: at 21:16

## 2023-05-16 RX ADMIN — Medication 50 GM/HR: at 14:51

## 2023-05-16 RX ADMIN — CHLORHEXIDINE GLUCONATE 1 APPLICATION(S): 213 SOLUTION TOPICAL at 06:08

## 2023-05-16 RX ADMIN — Medication 1000 MILLIUNIT(S)/MIN: at 12:13

## 2023-05-16 RX ADMIN — Medication 30 MILLIGRAM(S): at 14:30

## 2023-05-16 RX ADMIN — Medication 300 GRAM(S): at 14:23

## 2023-05-16 RX ADMIN — Medication 975 MILLIGRAM(S): at 14:30

## 2023-05-16 RX ADMIN — Medication 2 MILLIUNIT(S)/MIN: at 10:38

## 2023-05-16 RX ADMIN — SODIUM CHLORIDE 75 MILLILITER(S): 9 INJECTION, SOLUTION INTRAVENOUS at 19:10

## 2023-05-16 RX ADMIN — SODIUM CHLORIDE 300 MILLILITER(S): 9 INJECTION, SOLUTION INTRAVENOUS at 17:28

## 2023-05-16 NOTE — OB RN DELIVERY SUMMARY - NSSELHIDDEN_OBGYN_ALL_OB_FT
[NS_DeliveryAttending1_OBGYN_ALL_OB_FT:[id number]],[NS_DeliveryAssist1_OBGYN_ALL_OB_FT:[id number]],[NS_DeliveryRN_OBGYN_ALL_OB_FT:[id number]=]

## 2023-05-16 NOTE — DISCHARGE NOTE OB - MEDICATION SUMMARY - MEDICATIONS TO TAKE
I will START or STAY ON the medications listed below when I get home from the hospital:    ibuprofen 600 mg oral tablet  -- 1 tab(s) by mouth every 6 hours as needed for mild pain  -- Indication: For pain    acetaminophen 325 mg oral tablet  -- 3 tab(s) by mouth every 6 hours as needed for  mild pain  -- Indication: For pain    NIFEdipine 30 mg oral tablet, extended release  -- 1 tab(s) by mouth once a day as needed for Preeclampsia HOLD for BP less than 110/60, HR less than 60  -- Indication: For preeclampsia    dibucaine 1% topical ointment  -- 1 Apply on skin to affected area every 6 hours As needed Perineal discomfort  -- Indication: For vaginal delivery    witch hazel 50% topical pad  -- 1 Apply on skin to affected area every 4 hours As needed Perineal discomfort  -- Indication: For vaginal delivery   I will START or STAY ON the medications listed below when I get home from the hospital:    ibuprofen 600 mg oral tablet  -- 1 tab(s) by mouth every 6 hours as needed for mild pain  -- Indication: For pain    acetaminophen 325 mg oral tablet  -- 3 tab(s) by mouth every 6 hours as needed for  mild pain  -- Indication: For pain    valACYclovir 1 g oral tablet  -- 1 tab(s) by mouth every 12 hours  -- Indication: For hsv    NIFEdipine 30 mg oral tablet, extended release  -- 1 tab(s) by mouth once a day as needed for Preeclampsia HOLD for BP less than 110/60, HR less than 60  -- Indication: For preeclampsia    dibucaine 1% topical ointment  -- 1 Apply on skin to affected area every 6 hours As needed Perineal discomfort  -- Indication: For vaginal delivery    witch hazel 50% topical pad  -- 1 Apply on skin to affected area every 4 hours As needed Perineal discomfort  -- Indication: For vaginal delivery    docosanol 10% topical cream  -- 1 Apply on skin to affected area 5 times a day  -- Indication: For HSV

## 2023-05-16 NOTE — OB RN DELIVERY SUMMARY - NS_SEPSISRSKCALC_OBGYN_ALL_OB_FT
EOS calculated successfully. EOS Risk Factor: 0.5/1000 live births ([facility] national incidence); GA=38w4d; Temp=99.5; ROM=3.65; GBS='Negative'; Antibiotics='No antibiotics or any antibiotics < 2 hrs prior to birth'

## 2023-05-16 NOTE — DISCHARGE NOTE OB - PATIENT PORTAL LINK FT
You can access the FollowMyHealth Patient Portal offered by [facility] by registering at the following website: [email or web address]. By joining [employer]’s FollowMyHealth portal, you will also be able to view your health information using other applications (apps) compatible with our system.

## 2023-05-16 NOTE — OB PROVIDER DELIVERY SUMMARY - NSPROVIDERDELIVERYNOTE_OBGYN_ALL_OB_FT
Spontaneous vaginal delivery of liveborn female infant from REGINA position. Head, shoulders, and body delivered easily. Infant was suctioned. No mec. Delayed cord clamping and infant was passed to mother. Cord clamped and cut. Placenta delivered intact with a 3 vessel cord. Fundal massage was given and uterine fundus was found to be firm. Vaginal exam revealed an intact cervix, vaginal walls and sulci. Patient had a  first degree laceration in the perineum that was repaired with 2.0 chromic suture. Small left periurethral laceration repaired with 3.0 vicryl suture, straight catheterization performed to ensure urethral patency (100cc UOP). Excellent hemostasis was noted. Patient was stable and went to recovery. Count was correct x 2.    Apgars 9/9, infant weight 3030g    Delivery with attending, Dr. Sandra Dubois PGY1

## 2023-05-16 NOTE — DISCHARGE NOTE OB - CARE PROVIDER_API CALL
Deya Flores (DO)  Obstetrics and Gynecology  [address] [place] [address]  Phone: [phone]  Follow Up Time:

## 2023-05-16 NOTE — DISCHARGE NOTE OB - ADDITIONAL INSTRUCTIONS
Follow up @ [facility] office in 5-6wks for postpartum visit Follow up @ [facility] office in 3 days for BP check

## 2023-05-16 NOTE — OB PROVIDER LABOR PROGRESS NOTE - NS_SUBJECTIVE/OBJECTIVE_OBGYN_ALL_OB_FT
Patient seen and examined    FHTs CAT1 with moderate variability  cx: 5/890/0  AROM for clear fluid    38,5 IOL for elevated LFTs  -will order epi/pit
PGY2 Labor Note    Patient seen and evaluated at bedside.  Denies complaints.   T(C): 37.0 (05-16-23 @ 03:30), Max: 37.4 (05-15-23 @ 21:30)  HR: 51 (05-16-23 @ 04:51) (51 - 109)  BP: 108/66 (05-16-23 @ 04:51) (95/58 - 126/73)  RR: 16 (05-16-23 @ 03:30) (15 - 18)  SpO2: --
R1 Labor & Delivery Progress Note     Pt seen & examined at bedside for repeat VE.    T(C): 36.8 (05-16-23 @ 09:58), Max: 37.4 (05-15-23 @ 21:30)  HR: 58 (05-16-23 @ 10:29) (51 - 109)  BP: 142/60 (05-16-23 @ 10:23) (95/58 - 171/71)  RR: 16 (05-16-23 @ 08:51) (15 - 18)  SpO2: 95% (05-16-23 @ 10:29) (86% - 99%)
R1 Labor & Delivery Progress Note     Pt seen & examined at bedside for evaluation of cervical change. Pt comfortable without epidural.    T(C): 36.9 (05-15-23 @ 15:04), Max: 37.0 (05-15-23 @ 13:02)  HR: 56 (05-15-23 @ 15:05) (56 - 109)  BP: 110/69 (05-15-23 @ 15:05) (104/66 - 110/69)  RR: 16 (05-15-23 @ 15:04) (15 - 16)

## 2023-05-16 NOTE — OB POSTPARTUM EVENT NOTE - NS_EVENTFINDINGS1_OBGYN_ALL_OB_FT
magnesium 4g administered at 1423.  magnesium 2g administered at 1450.  pitocin infusing at 50cc/hr  hydralazine 5mg IVP given at 1430.  patient reports pain relief 0/10 with IV toradol and tylenol.  magnesium level q6h  lungs and reflexes q2h    patient informed of plan of care and verbalizes understanding. all questions answered. understanding verbalizes. patient educated on severe pre-eclampsia, s/s of pre-eclampsia, and s/s of a seizure. indication and potential side effects of magnesium and hydralazine discussed. verbal understanding obtained at this time.

## 2023-05-16 NOTE — DISCHARGE NOTE OB - NS MD DC FALL RISK RISK
For information on Fall & Injury Prevention, visit: [email or web address] OR  [email or web address] OR  https://www.cdc.gov/steadi/patient.html

## 2023-05-16 NOTE — OB NEONATOLOGY/PEDIATRICIAN DELIVERY SUMMARY - NSPEDSNEONOTESA_OBGYN_ALL_OB_FT
Baby is a 38.4 wk GA female born to a 27 y/o  mother via , IOL for elevated LFTs of unknown etiology. PEDS called to delivery for cat II tracing. Maternal history above. Prenatal history uncomplicated. Maternal blood type O+. PNL negative, non-reactive, and immune. GBS negative on . AROM at 0831 on , clear fluids. Baby born vigorous and crying spontaneously. Warmed, dried, stimulated. Apgars 9/9. EOS 0.12. Mom plans to breastfeed/bottlefeed and consents hepB.  BW: 3030 g  : 23  TOB: 12:10    Physical Exam:  Gen: NAD, +grimace  HEENT: anterior fontanel open soft and flat, no cleft lip/palate, ears normal set, no ear pits or tags. no lesions in mouth/throat, nares clinically patent  Resp: no increased work of breathing, good air entry b/l, clear to auscultation bilaterally  Cardio: normal S1/S2, regular rate and rhythm, no murmur appreciated  Abd: soft, non tender, non distended, + bowel sounds, umbilical cord with 3 vessels  Back: +shallow sacral dimple with base, spine midline, no tuft of hair  Neuro: +grasp/suck/payton/palmar/plantar, normal tone  Extremities: negative singleton and ortolani, moving all extremities, full range of motion x 4, no crepitus  Skin: pink, warm  Genitals: Normal female anatomy, [place] 1, anus patent

## 2023-05-16 NOTE — OB PROVIDER DELIVERY SUMMARY - NSSELHIDDEN_OBGYN_ALL_OB_FT
[NS_DeliveryAttending1_OBGYN_ALL_OB_FT:[id number]],[NS_DeliveryAssist1_OBGYN_ALL_OB_FT:[id number]],[NS_DeliveryRN_OBGYN_ALL_OB_FT:[id number]]

## 2023-05-16 NOTE — OB POSTPARTUM EVENT NOTE - NS_EVENTSUMMARY1_OBGYN_ALL_OB_FT
(late charting due to patient care) patient calls RN to bedside for severe headache scale 7/10. patient had  at 1210 IOL for elevated LFTs with unknown etiology. patient reports trouble breathing with oxygen saturation WNL. patient sits high fowlers with improvement in breathing. MD Dubois at bedside. 975 mg PO tylenol given at 1407 and 30mg IV tylenol given at 1410. 2 minutes later, patient calls RN to bedside with headache 10/10, crying in pain, and shaking uncontrollably. MD Dubois reports back to bedside. BP noted to be 187/84. labile pressures noted after delivery but no severes until 1410. HR 60s-70s. plan fo care discussed with MD Flores. Patient to receive magnesium for seizure prophylaxis. repeat blood pressure 164/84 with HR 60s. hydralazine 5mg IVP ordered and given due to patient profusely vomiting.

## 2023-05-16 NOTE — OB PROVIDER DELIVERY SUMMARY - NSLOWPPHRISK_OBGYN_A_OB
No previous uterine incision/[place] Pregnancy/Less than or equal to 4 previous vaginal births/No known bleeding disorder/No history of postpartum hemorrhage/No other PPH risks indicated

## 2023-05-16 NOTE — OB PROVIDER LABOR PROGRESS NOTE - ASSESSMENT
Plan: 28y y/o  @38w3d in stable condition  - Patient has received 2 doses of buccal cytotec and is making cervical change, now /-3 from 0/3  - Will defer placement of cervical balloon at this time to evaluate if pt continues to make change on buccal cytotec alone  - Con't IOL with next dose of buccal cytotec at 625p  - Continuous EFM, [place]  - Con't IVF    d/w attending physician Dr. Sandra Dubois PGY1
Plan: 28y y/o  @38w4d in stable condition  - Con't IOL with transition to Pitocin  - Continuous EFM, [place]  - Con't IVF    d/w attending physician Dr. Sandra Dubois PGY1
A/P: 28y P0 admitted for IOL 2/2 elevated LFTs  - c/w BC   - continuous monitoring/toco   - epi for pian   - maternal repositioning/fluids prn for resuscitation     Anne Benitez PGY2

## 2023-05-17 LAB
ALBUMIN SERPL ELPH-MCNC: 3.1 G/DL — LOW (ref 3.3–5)
ALP SERPL-CCNC: 166 U/L — HIGH (ref 40–120)
ALT FLD-CCNC: 80 U/L — HIGH (ref 4–33)
ANION GAP SERPL CALC-SCNC: 13 MMOL/L — SIGNIFICANT CHANGE UP (ref 7–14)
APTT BLD: 27.1 SEC — SIGNIFICANT CHANGE UP (ref 27–36.3)
AST SERPL-CCNC: 40 U/L — HIGH (ref 4–32)
BASOPHILS # BLD AUTO: 0.06 K/UL — SIGNIFICANT CHANGE UP (ref 0–0.2)
BASOPHILS NFR BLD AUTO: 0.3 % — SIGNIFICANT CHANGE UP (ref 0–2)
BILIRUB SERPL-MCNC: 0.3 MG/DL — SIGNIFICANT CHANGE UP (ref 0.2–1.2)
BUN SERPL-MCNC: 6 MG/DL — LOW (ref 7–23)
CALCIUM SERPL-MCNC: 8 MG/DL — LOW (ref 8.4–10.5)
CHLORIDE SERPL-SCNC: 103 MMOL/L — SIGNIFICANT CHANGE UP (ref 98–107)
CO2 SERPL-SCNC: 22 MMOL/L — SIGNIFICANT CHANGE UP (ref 22–31)
CREAT SERPL-MCNC: 0.76 MG/DL — SIGNIFICANT CHANGE UP (ref 0.5–1.3)
EGFR: 110 ML/MIN/1.73M2 — SIGNIFICANT CHANGE UP
EOSINOPHIL # BLD AUTO: 0.02 K/UL — SIGNIFICANT CHANGE UP (ref 0–0.5)
EOSINOPHIL NFR BLD AUTO: 0.1 % — SIGNIFICANT CHANGE UP (ref 0–6)
FIBRINOGEN PPP-MCNC: 478 MG/DL — HIGH (ref 200–465)
GLUCOSE SERPL-MCNC: 96 MG/DL — SIGNIFICANT CHANGE UP (ref 70–99)
HCT VFR BLD CALC: 34.5 % — SIGNIFICANT CHANGE UP (ref 34.5–45)
HGB BLD-MCNC: 11.5 G/DL — SIGNIFICANT CHANGE UP (ref 11.5–15.5)
IANC: 16.07 K/UL — HIGH (ref 1.8–7.4)
IMM GRANULOCYTES NFR BLD AUTO: 0.6 % — SIGNIFICANT CHANGE UP (ref 0–0.9)
INR BLD: <0.9 RATIO — LOW (ref 0.88–1.16)
LDH SERPL L TO P-CCNC: 217 U/L — SIGNIFICANT CHANGE UP (ref 135–225)
LYMPHOCYTES # BLD AUTO: 13.9 % — SIGNIFICANT CHANGE UP (ref 13–44)
LYMPHOCYTES # BLD AUTO: 2.85 K/UL — SIGNIFICANT CHANGE UP (ref 1–3.3)
MAGNESIUM SERPL-MCNC: 4.8 MG/DL — HIGH (ref 1.6–2.6)
MAGNESIUM SERPL-MCNC: 4.8 MG/DL — HIGH (ref 1.6–2.6)
MCHC RBC-ENTMCNC: 30.7 PG — SIGNIFICANT CHANGE UP (ref 27–34)
MCHC RBC-ENTMCNC: 33.3 GM/DL — SIGNIFICANT CHANGE UP (ref 32–36)
MCV RBC AUTO: 92 FL — SIGNIFICANT CHANGE UP (ref 80–100)
MONOCYTES # BLD AUTO: 1.4 K/UL — HIGH (ref 0–0.9)
MONOCYTES NFR BLD AUTO: 6.8 % — SIGNIFICANT CHANGE UP (ref 2–14)
NEUTROPHILS # BLD AUTO: 16.07 K/UL — HIGH (ref 1.8–7.4)
NEUTROPHILS NFR BLD AUTO: 78.3 % — HIGH (ref 43–77)
NRBC # BLD: 0 /100 WBCS — SIGNIFICANT CHANGE UP (ref 0–0)
NRBC # FLD: 0 K/UL — SIGNIFICANT CHANGE UP (ref 0–0)
PLATELET # BLD AUTO: 199 K/UL — SIGNIFICANT CHANGE UP (ref 150–400)
POTASSIUM SERPL-MCNC: 3.7 MMOL/L — SIGNIFICANT CHANGE UP (ref 3.5–5.3)
POTASSIUM SERPL-SCNC: 3.7 MMOL/L — SIGNIFICANT CHANGE UP (ref 3.5–5.3)
PROT SERPL-MCNC: 5.8 G/DL — LOW (ref 6–8.3)
PROTHROM AB SERPL-ACNC: 10 SEC — LOW (ref 10.5–13.4)
RBC # BLD: 3.75 M/UL — LOW (ref 3.8–5.2)
RBC # FLD: 13.2 % — SIGNIFICANT CHANGE UP (ref 10.3–14.5)
SODIUM SERPL-SCNC: 138 MMOL/L — SIGNIFICANT CHANGE UP (ref 135–145)
URATE SERPL-MCNC: 8.6 MG/DL — HIGH (ref 2.5–7)
WBC # BLD: 20.52 K/UL — HIGH (ref 3.8–10.5)
WBC # FLD AUTO: 20.52 K/UL — HIGH (ref 3.8–10.5)

## 2023-05-17 RX ORDER — IBUPROFEN 200 MG
600 TABLET ORAL EVERY 6 HOURS
Refills: 0 | Status: DISCONTINUED | OUTPATIENT
Start: 2023-05-17 | End: 2023-05-19

## 2023-05-17 RX ADMIN — Medication 975 MILLIGRAM(S): at 21:07

## 2023-05-17 RX ADMIN — Medication 975 MILLIGRAM(S): at 21:40

## 2023-05-17 RX ADMIN — SODIUM CHLORIDE 3 MILLILITER(S): 9 INJECTION INTRAMUSCULAR; INTRAVENOUS; SUBCUTANEOUS at 05:24

## 2023-05-17 RX ADMIN — Medication 1 TABLET(S): at 12:12

## 2023-05-17 RX ADMIN — Medication 25 GM/HR: at 07:19

## 2023-05-17 RX ADMIN — Medication 600 MILLIGRAM(S): at 12:48

## 2023-05-17 RX ADMIN — Medication 975 MILLIGRAM(S): at 09:00

## 2023-05-17 RX ADMIN — SODIUM CHLORIDE 3 MILLILITER(S): 9 INJECTION INTRAMUSCULAR; INTRAVENOUS; SUBCUTANEOUS at 21:40

## 2023-05-17 RX ADMIN — Medication 600 MILLIGRAM(S): at 12:12

## 2023-05-17 RX ADMIN — Medication 975 MILLIGRAM(S): at 08:08

## 2023-05-18 LAB
ALBUMIN SERPL ELPH-MCNC: 3.5 G/DL — SIGNIFICANT CHANGE UP (ref 3.3–5)
ALP SERPL-CCNC: 160 U/L — HIGH (ref 40–120)
ALT FLD-CCNC: 72 U/L — HIGH (ref 4–33)
ANION GAP SERPL CALC-SCNC: 13 MMOL/L — SIGNIFICANT CHANGE UP (ref 7–14)
APTT BLD: 28.4 SEC — SIGNIFICANT CHANGE UP (ref 27–36.3)
AST SERPL-CCNC: 33 U/L — HIGH (ref 4–32)
BASOPHILS # BLD AUTO: 0.07 K/UL — SIGNIFICANT CHANGE UP (ref 0–0.2)
BASOPHILS NFR BLD AUTO: 0.3 % — SIGNIFICANT CHANGE UP (ref 0–2)
BILIRUB SERPL-MCNC: 0.2 MG/DL — SIGNIFICANT CHANGE UP (ref 0.2–1.2)
BUN SERPL-MCNC: 8 MG/DL — SIGNIFICANT CHANGE UP (ref 7–23)
CALCIUM SERPL-MCNC: 9.1 MG/DL — SIGNIFICANT CHANGE UP (ref 8.4–10.5)
CHLORIDE SERPL-SCNC: 104 MMOL/L — SIGNIFICANT CHANGE UP (ref 98–107)
CO2 SERPL-SCNC: 22 MMOL/L — SIGNIFICANT CHANGE UP (ref 22–31)
CREAT SERPL-MCNC: 0.69 MG/DL — SIGNIFICANT CHANGE UP (ref 0.5–1.3)
EGFR: 122 ML/MIN/1.73M2 — SIGNIFICANT CHANGE UP
EOSINOPHIL # BLD AUTO: 0.11 K/UL — SIGNIFICANT CHANGE UP (ref 0–0.5)
EOSINOPHIL NFR BLD AUTO: 0.5 % — SIGNIFICANT CHANGE UP (ref 0–6)
FIBRINOGEN PPP-MCNC: 633 MG/DL — HIGH (ref 200–465)
GLUCOSE SERPL-MCNC: 71 MG/DL — SIGNIFICANT CHANGE UP (ref 70–99)
HCT VFR BLD CALC: 37.5 % — SIGNIFICANT CHANGE UP (ref 34.5–45)
HGB BLD-MCNC: 12.3 G/DL — SIGNIFICANT CHANGE UP (ref 11.5–15.5)
IANC: 16.9 K/UL — HIGH (ref 1.8–7.4)
IMM GRANULOCYTES NFR BLD AUTO: 0.7 % — SIGNIFICANT CHANGE UP (ref 0–0.9)
INR BLD: <0.9 RATIO — LOW (ref 0.88–1.16)
LDH SERPL L TO P-CCNC: 214 U/L — SIGNIFICANT CHANGE UP (ref 135–225)
LYMPHOCYTES # BLD AUTO: 13.6 % — SIGNIFICANT CHANGE UP (ref 13–44)
LYMPHOCYTES # BLD AUTO: 2.91 K/UL — SIGNIFICANT CHANGE UP (ref 1–3.3)
MCHC RBC-ENTMCNC: 31.1 PG — SIGNIFICANT CHANGE UP (ref 27–34)
MCHC RBC-ENTMCNC: 32.8 GM/DL — SIGNIFICANT CHANGE UP (ref 32–36)
MCV RBC AUTO: 94.9 FL — SIGNIFICANT CHANGE UP (ref 80–100)
MONOCYTES # BLD AUTO: 1.25 K/UL — HIGH (ref 0–0.9)
MONOCYTES NFR BLD AUTO: 5.8 % — SIGNIFICANT CHANGE UP (ref 2–14)
NEUTROPHILS # BLD AUTO: 16.9 K/UL — HIGH (ref 1.8–7.4)
NEUTROPHILS NFR BLD AUTO: 79.1 % — HIGH (ref 43–77)
NRBC # BLD: 0 /100 WBCS — SIGNIFICANT CHANGE UP (ref 0–0)
NRBC # FLD: 0 K/UL — SIGNIFICANT CHANGE UP (ref 0–0)
PLATELET # BLD AUTO: 238 K/UL — SIGNIFICANT CHANGE UP (ref 150–400)
POTASSIUM SERPL-MCNC: 4.1 MMOL/L — SIGNIFICANT CHANGE UP (ref 3.5–5.3)
POTASSIUM SERPL-SCNC: 4.1 MMOL/L — SIGNIFICANT CHANGE UP (ref 3.5–5.3)
PROT SERPL-MCNC: 6.3 G/DL — SIGNIFICANT CHANGE UP (ref 6–8.3)
PROTHROM AB SERPL-ACNC: 9.7 SEC — LOW (ref 10.5–13.4)
RBC # BLD: 3.95 M/UL — SIGNIFICANT CHANGE UP (ref 3.8–5.2)
RBC # FLD: 13.2 % — SIGNIFICANT CHANGE UP (ref 10.3–14.5)
SODIUM SERPL-SCNC: 139 MMOL/L — SIGNIFICANT CHANGE UP (ref 135–145)
URATE SERPL-MCNC: 8.3 MG/DL — HIGH (ref 2.5–7)
WBC # BLD: 21.39 K/UL — HIGH (ref 3.8–10.5)
WBC # FLD AUTO: 21.39 K/UL — HIGH (ref 3.8–10.5)

## 2023-05-18 RX ORDER — ERTAPENEM SODIUM 1 G/1
1000 INJECTION, POWDER, LYOPHILIZED, FOR SOLUTION INTRAMUSCULAR; INTRAVENOUS ONCE
Refills: 0 | Status: DISCONTINUED | OUTPATIENT
Start: 2023-05-18 | End: 2023-05-18

## 2023-05-18 RX ORDER — ERTAPENEM SODIUM 1 G/1
1000 INJECTION, POWDER, LYOPHILIZED, FOR SOLUTION INTRAMUSCULAR; INTRAVENOUS EVERY 24 HOURS
Refills: 0 | Status: DISCONTINUED | OUTPATIENT
Start: 2023-05-18 | End: 2023-05-19

## 2023-05-18 RX ORDER — NIFEDIPINE 30 MG
1 TABLET, EXTENDED RELEASE 24 HR ORAL
Qty: 0 | Refills: 0 | DISCHARGE
Start: 2023-05-18

## 2023-05-18 RX ADMIN — Medication 975 MILLIGRAM(S): at 21:25

## 2023-05-18 RX ADMIN — Medication 975 MILLIGRAM(S): at 20:25

## 2023-05-18 RX ADMIN — PRAMOXINE HYDROCHLORIDE 1 APPLICATION(S): 150 AEROSOL, FOAM RECTAL at 20:29

## 2023-05-18 RX ADMIN — SODIUM CHLORIDE 3 MILLILITER(S): 9 INJECTION INTRAMUSCULAR; INTRAVENOUS; SUBCUTANEOUS at 22:24

## 2023-05-18 RX ADMIN — Medication 1 APPLICATION(S): at 20:29

## 2023-05-18 RX ADMIN — ERTAPENEM SODIUM 120 MILLIGRAM(S): 1 INJECTION, POWDER, LYOPHILIZED, FOR SOLUTION INTRAMUSCULAR; INTRAVENOUS at 15:39

## 2023-05-18 NOTE — CHART NOTE - NSCHARTNOTEFT_GEN_A_CORE
Called by RN in regards to patient having had discrepancies in her orthostatic blood pressures. Standing patient had systolic BPs in the 110s, and sitting had systolic blood pressures in the 80s. The patient started to become hypotensive upon starting her magnesium infusion. She was symptomatic and was having increased dizziness.     Patient was seen and examined at bedside for further evaluation. She does report dizziness and states that she thinks it is due to her poor PO intake today. Denies any headaches, weakness, nausea, vomiting, RUQ pain and no visual changes.       General: sitting up in bed in mild distress due to headache pain  Cardiovascular: regular rate and rhythm  Respiratory: normal respiratory effort, lungs clear to auscultation b/l without wheeze or crackles  Neuro: AAO X 3, Sensation intact. PERRLA, No nystagmus noted.   Extremities: no pitting edema in lower extremities    PLAN:  -Continue Magnesium Infusion, will decrease overall rate to 1g/hr in conjunction with LR @75cc/hr   -Will continue to monitor for magnesium toxicity   -Continue to monitor blood pressure closely     Discussed with Attending Dr. Sandra Retana PGY2
Patient is PPD2 from     CBC reviewed: WBC noted to be increasing  WBC 16.8>>20.5>>21.3    LABS:  cret                        12.3   21.39 )-----------( 238      ( 18 May 2023 06:10 )             37.5         139  |  104  |  8   ----------------------------<  71  4.1   |  22  |  0.69    Ca    9.1      18 May 2023 06:10  Mg     4.80         TPro  6.3  /  Alb  3.5  /  TBili  0.2  /  DBili  x   /  AST  33<H>  /  ALT  72<H>  /  AlkPhos  160<H>      PT/INR - ( 18 May 2023 06:10 )   PT: 9.7 sec;   INR: <0.90 ratio         PTT - ( 18 May 2023 06:10 )  PTT:28.4 sec      On exam, patient reports no tenderness  Vaginal bleeding noted WNL, changing pads every 4hrs, not fully saturated pads  Uterus is firm and 2 FB below umbilicus
Patient seen at bedside for headache /10 in severity that started minutes ago. Pt is several hours s/p , uncomplicated. Denies vision changes, nausea, vomiting, RUQ pain, epigastric pain.     BP on evaluation: 145/85, repeat /84    General: sitting up in bed in mild distress due to headache pain  Cardiovascular: regular rate and rhythm  Respiratory: normal respiratory effort, lungs clear to auscultation b/l without wheeze or crackles  Extremities: no pitting edema in lower extremities    PLAN:  - Tylenol and Toradol now  - Patient meeting criteria for preeclampsia with severe features by symptoms (headache)  - Start Mg bolus for seizure prophylaxis  - F/u 15min repeat BP, no immediate release antihypertensive at this time    Discussed with attending, Dr. Sandra Key PGY1
Patient seen at bedside to discuss placement of cervical balloon. Mechanism of the cervical balloon discussed, all questions answered. Pt just received second dose of buccal cytotec at 325p. Pt requesting 1 additional hour prior to repeat VE and placement of cervical balloon.    Yesi PGY1

## 2023-05-19 VITALS
DIASTOLIC BLOOD PRESSURE: 68 MMHG | HEART RATE: 91 BPM | TEMPERATURE: 98 F | SYSTOLIC BLOOD PRESSURE: 103 MMHG | RESPIRATION RATE: 18 BRPM | OXYGEN SATURATION: 100 %

## 2023-05-19 DIAGNOSIS — B00.1 HERPESVIRAL VESICULAR DERMATITIS: ICD-10-CM

## 2023-05-19 DIAGNOSIS — O14.90 UNSPECIFIED PRE-ECLAMPSIA, UNSPECIFIED TRIMESTER: ICD-10-CM

## 2023-05-19 LAB
BASOPHILS # BLD AUTO: 0.05 K/UL — SIGNIFICANT CHANGE UP (ref 0–0.2)
BASOPHILS NFR BLD AUTO: 0.3 % — SIGNIFICANT CHANGE UP (ref 0–2)
EOSINOPHIL # BLD AUTO: 0.19 K/UL — SIGNIFICANT CHANGE UP (ref 0–0.5)
EOSINOPHIL NFR BLD AUTO: 1.1 % — SIGNIFICANT CHANGE UP (ref 0–6)
HCT VFR BLD CALC: 37.9 % — SIGNIFICANT CHANGE UP (ref 34.5–45)
HGB BLD-MCNC: 12.4 G/DL — SIGNIFICANT CHANGE UP (ref 11.5–15.5)
IANC: 13.1 K/UL — HIGH (ref 1.8–7.4)
IMM GRANULOCYTES NFR BLD AUTO: 0.5 % — SIGNIFICANT CHANGE UP (ref 0–0.9)
LYMPHOCYTES # BLD AUTO: 17.6 % — SIGNIFICANT CHANGE UP (ref 13–44)
LYMPHOCYTES # BLD AUTO: 3.09 K/UL — SIGNIFICANT CHANGE UP (ref 1–3.3)
MCHC RBC-ENTMCNC: 30.5 PG — SIGNIFICANT CHANGE UP (ref 27–34)
MCHC RBC-ENTMCNC: 32.7 GM/DL — SIGNIFICANT CHANGE UP (ref 32–36)
MCV RBC AUTO: 93.3 FL — SIGNIFICANT CHANGE UP (ref 80–100)
MONOCYTES # BLD AUTO: 1.08 K/UL — HIGH (ref 0–0.9)
MONOCYTES NFR BLD AUTO: 6.1 % — SIGNIFICANT CHANGE UP (ref 2–14)
NEUTROPHILS # BLD AUTO: 13.1 K/UL — HIGH (ref 1.8–7.4)
NEUTROPHILS NFR BLD AUTO: 74.4 % — SIGNIFICANT CHANGE UP (ref 43–77)
NRBC # BLD: 0 /100 WBCS — SIGNIFICANT CHANGE UP (ref 0–0)
NRBC # FLD: 0 K/UL — SIGNIFICANT CHANGE UP (ref 0–0)
PLATELET # BLD AUTO: 289 K/UL — SIGNIFICANT CHANGE UP (ref 150–400)
RBC # BLD: 4.06 M/UL — SIGNIFICANT CHANGE UP (ref 3.8–5.2)
RBC # FLD: 13.2 % — SIGNIFICANT CHANGE UP (ref 10.3–14.5)
WBC # BLD: 17.6 K/UL — HIGH (ref 3.8–10.5)
WBC # FLD AUTO: 17.6 K/UL — HIGH (ref 3.8–10.5)

## 2023-05-19 RX ORDER — DOCOSANOL 100 MG/G
1 CREAM TOPICAL
Refills: 0 | Status: DISCONTINUED | OUTPATIENT
Start: 2023-05-19 | End: 2023-05-19

## 2023-05-19 RX ORDER — VALACYCLOVIR 500 MG/1
1000 TABLET, FILM COATED ORAL EVERY 12 HOURS
Refills: 0 | Status: DISCONTINUED | OUTPATIENT
Start: 2023-05-19 | End: 2023-05-19

## 2023-05-19 RX ORDER — VALACYCLOVIR 500 MG/1
1 TABLET, FILM COATED ORAL
Qty: 0 | Refills: 0 | DISCHARGE
Start: 2023-05-19

## 2023-05-19 RX ORDER — DOCOSANOL 100 MG/G
1 CREAM TOPICAL
Qty: 0 | Refills: 0 | DISCHARGE
Start: 2023-05-19

## 2023-05-19 RX ADMIN — SODIUM CHLORIDE 3 MILLILITER(S): 9 INJECTION INTRAMUSCULAR; INTRAVENOUS; SUBCUTANEOUS at 06:50

## 2023-05-19 RX ADMIN — Medication 600 MILLIGRAM(S): at 12:05

## 2023-05-19 RX ADMIN — VALACYCLOVIR 1000 MILLIGRAM(S): 500 TABLET, FILM COATED ORAL at 12:04

## 2023-05-19 RX ADMIN — Medication 1 TABLET(S): at 12:06

## 2023-05-19 RX ADMIN — Medication 600 MILLIGRAM(S): at 13:00

## 2023-05-19 RX ADMIN — DOCOSANOL 1 APPLICATION(S): 100 CREAM TOPICAL at 12:05

## 2023-05-19 NOTE — PROGRESS NOTE ADULT - SUBJECTIVE AND OBJECTIVE BOX
ANESTHESIA POST-EPIDURAL CHECK    28y Female s/p  DAY 1     No COMPLAINTS    NO APPARENT ANESTHESIA COMPLICATIONS      
OB Progress Note:  PPD#1    S: 27yo  PPD#1 s/p  with immediate postpartum course c/b development of preeclampsia with severe features. Currently asymptomatic, denies headache, vision changes, lightheadedness, CP, SOB, RUQ pain, epigastric pain, N/V. Patient feels well. Pain is well controlled. She is tolerating a regular diet and passing flatus. She is voiding spontaneously, and ambulating with assistance while on Mg.    O:  Vitals:  Vital Signs Last 24 Hrs  T(C): 36.9 (17 May 2023 05:30), Max: 37.5 (16 May 2023 12:00)  T(F): 98.5 (17 May 2023 05:30), Max: 99.5 (16 May 2023 12:00)  HR: 93 (17 May 2023 05:30) (51 - 112)  BP: 97/51 (17 May 2023 05:30) (88/49 - 187/84)  RR: 16 (17 May 2023 05:30) (15 - 16)  SpO2: 98% (17 May 2023 05:30) (86% - 100%)    Parameters below as of 17 May 2023 05:30  Patient On (Oxygen Delivery Method): room air    MEDICATIONS  (STANDING):  acetaminophen     Tablet .. 975 milliGRAM(s) Oral <User Schedule>  diphtheria/tetanus/pertussis (acellular) Vaccine (Adacel) 0.5 milliLiter(s) IntraMuscular once  ibuprofen  Tablet. 600 milliGRAM(s) Oral every 6 hours  lactated ringers. 1000 milliLiter(s) (75 mL/Hr) IV Continuous <Continuous>  magnesium sulfate Infusion 1 Gm/Hr (25 mL/Hr) IV Continuous <Continuous>  NIFEdipine XL 30 milliGRAM(s) Oral daily  oxytocin Infusion 16.667 milliUNIT(s)/Min (50 mL/Hr) IV Continuous <Continuous>  oxytocin Infusion. 2 milliUNIT(s)/Min (2 mL/Hr) IV Continuous <Continuous>  prenatal multivitamin 1 Tablet(s) Oral daily  sodium chloride 0.9% lock flush 3 milliLiter(s) IV Push every 8 hours    Labs:  Blood type: O Positive  Rubella IgG: RPR: Negative                          11.4<L>   16.18<H> >-----------< 194    ( 05-16 @ 20:40 )             34.9                        13.2   14.62<H> >-----------< 201    (  @ 13:00 )             40.2                        12.5   10.79<H> >-----------< 178    (  @ 07:45 )             38.3                        12.0   9.27 >-----------< 210    ( 05-15 @ 11:30 )             36.9    23 @ 13:00      139  |  106  |  8   ----------------------------<  75  4.0   |  19<L>  |  0.72    23 @ 05:51      138  |  107  |  7   ----------------------------<  69<L>  3.9   |  17<L>  |  0.66    05-15-23 @ 11:30      136  |  103  |  11  ----------------------------<  89  4.1   |  20<L>  |  0.61        Ca    9.2      16 May 2023 13:00  Ca    9.2      16 May 2023 05:51  Ca    9.2      15 May 2023 11:30  Mg     4.80<H>       Mg     5.10<H>         TPro  6.0  /  Alb  3.3  /  TBili  0.3  /  DBili  x   /  AST  43<H>  /  ALT  97<H>  /  AlkPhos  197<H>  23 @ 13:00  TPro  6.3  /  Alb  3.2<L>  /  TBili  0.3  /  DBili  x   /  AST  42<H>  /  ALT  103<H>  /  AlkPhos  195<H>  23 @ 05:51  TPro  6.2  /  Alb  3.4  /  TBili  <0.2  /  DBili  x   /  AST  45<H>  /  ALT  112<H>  /  AlkPhos  193<H>  05-15-23 @ 11:30    Physical Exam:  General: NAD  Abdomen: soft, non-tender, non-distended, fundus firm  Vaginal: Lochia wnl  Extremities: No erythema/edema
OB Progress Note:  PPD#2    S: 27yo PPD#2 s/p  with immediate postpartum course c/b development of preeclampsia with severe features. Currently asymptomatic, denies headache, vision changes, lightheadedness, CP, SOB, RUQ pain, epigastric pain, N/V. Patient feels well. Pain is well controlled. She is tolerating a regular diet and passing flatus. She is voiding spontaneously, and ambulating without issues.     O:  Vitals:  Vital Signs Last 24 Hrs  T(C): 36.9 (18 May 2023 06:36), Max: 37.1 (17 May 2023 22:01)  T(F): 98.5 (18 May 2023 06:36), Max: 98.7 (17 May 2023 22:01)  HR: 62 (18 May 2023 06:36) (62 - 101)  BP: 109/71 (18 May 2023 06:36) (97/60 - 109/71)  RR: 18 (18 May 2023 06:36) (16 - 18)  SpO2: 99% (18 May 2023 06:36) (98% - 100%)    Parameters below as of 18 May 2023 06:36  Patient On (Oxygen Delivery Method): room air    MEDICATIONS  (STANDING):  acetaminophen     Tablet .. 975 milliGRAM(s) Oral <User Schedule>  diphtheria/tetanus/pertussis (acellular) Vaccine (Adacel) 0.5 milliLiter(s) IntraMuscular once  ibuprofen  Tablet. 600 milliGRAM(s) Oral every 6 hours  lactated ringers. 1000 milliLiter(s) (75 mL/Hr) IV Continuous <Continuous>  magnesium sulfate Infusion 1 Gm/Hr (25 mL/Hr) IV Continuous <Continuous>  NIFEdipine XL 30 milliGRAM(s) Oral daily  oxytocin Infusion 16.667 milliUNIT(s)/Min (50 mL/Hr) IV Continuous <Continuous>  oxytocin Infusion. 2 milliUNIT(s)/Min (2 mL/Hr) IV Continuous <Continuous>  prenatal multivitamin 1 Tablet(s) Oral daily  sodium chloride 0.9% lock flush 3 milliLiter(s) IV Push every 8 hours    Labs:  Blood type: O Positive  Rubella IgG: RPR: Negative                          11.4<L>   16.18<H> >-----------< 194    ( 05-16 @ 20:40 )             34.9                        13.2   14.62<H> >-----------< 201    (  @ 13:00 )             40.2                        12.5   10.79<H> >-----------< 178    (  @ 07:45 )             38.3                        12.0   9.27 >-----------< 210    ( 05-15 @ 11:30 )             36.9    23 @ 13:00      139  |  106  |  8   ----------------------------<  75  4.0   |  19<L>  |  0.72    23 @ 05:51      138  |  107  |  7   ----------------------------<  69<L>  3.9   |  17<L>  |  0.66    05-15-23 @ 11:30      136  |  103  |  11  ----------------------------<  89  4.1   |  20<L>  |  0.61        Ca    9.2      16 May 2023 13:00  Ca    9.2      16 May 2023 05:51  Ca    9.2      15 May 2023 11:30  Mg     4.80<H>       Mg     5.10<H>         TPro  6.0  /  Alb  3.3  /  TBili  0.3  /  DBili  x   /  AST  43<H>  /  ALT  97<H>  /  AlkPhos  197<H>  23 @ 13:00  TPro  6.3  /  Alb  3.2<L>  /  TBili  0.3  /  DBili  x   /  AST  42<H>  /  ALT  103<H>  /  AlkPhos  195<H>  23 @ 05:51  TPro  6.2  /  Alb  3.4  /  TBili  <0.2  /  DBili  x   /  AST  45<H>  /  ALT  112<H>  /  AlkPhos  193<H>  05-15-23 @ 11:30    Physical Exam:  General: NAD  Abdomen: soft, non-tender, non-distended, fundus firm  Vaginal: Lochia wnl  Extremities: No erythema/edema
OB Progress Note:  PPD#3    S: Patient feels well. Pain is well controlled. She is tolerating a regular diet and passing flatus. She is voiding spontaneously, and ambulating without difficulty. Denies CP/SOB. Denies HA/lightheadedness/dizziness. Denies N/V. Denies calf pain.    O:  Vitals:   Vital Signs Last 24 Hrs  T(C): 36.9 (19 May 2023 09:44), Max: 37.2 (18 May 2023 18:15)  T(F): 98.4 (19 May 2023 09:44), Max: 98.9 (18 May 2023 18:15)  HR: 80 (19 May 2023 09:44) (60 - 96)  BP: 94/68 (19 May 2023 09:44) (94/68 - 119/74)  BP(mean): --  RR: 16 (19 May 2023 09:44) (16 - 18)  SpO2: 100% (19 May 2023 09:44) (99% - 100%)    Parameters below as of 19 May 2023 09:44  Patient On (Oxygen Delivery Method): room air        Physical Exam:  General: NAD  Head: erythematous clustered vesicular lesion present on upper R lip   Abdomen: soft, non-tender, non-distended, fundus firm  Vaginal: lochia wnl  Extremities: No erythema/calf tenderness        MEDICATIONS  (STANDING):  acetaminophen     Tablet .. 975 milliGRAM(s) Oral <User Schedule>  diphtheria/tetanus/pertussis (acellular) Vaccine (Adacel) 0.5 milliLiter(s) IntraMuscular once  docosanol 10% Cream 1 Application(s) Topical five times a day  ertapenem  IVPB 1000 milliGRAM(s) IV Intermittent every 24 hours  ibuprofen  Tablet. 600 milliGRAM(s) Oral every 6 hours  NIFEdipine XL 30 milliGRAM(s) Oral daily  prenatal multivitamin 1 Tablet(s) Oral daily  sodium chloride 0.9% lock flush 3 milliLiter(s) IV Push every 8 hours  valACYclovir 1000 milliGRAM(s) Oral every 12 hours    MEDICATIONS  (PRN):  benzocaine 20%/menthol 0.5% Spray 1 Spray(s) Topical every 6 hours PRN for Perineal discomfort  dibucaine 1% Ointment 1 Application(s) Topical every 6 hours PRN Perineal discomfort  diphenhydrAMINE 25 milliGRAM(s) Oral every 6 hours PRN Pruritus  hydrocortisone 1% Cream 1 Application(s) Topical every 6 hours PRN Moderate Pain (4-6)  lanolin Ointment 1 Application(s) Topical every 6 hours PRN nipple soreness  magnesium hydroxide Suspension 30 milliLiter(s) Oral two times a day PRN Constipation  oxyCODONE    IR 5 milliGRAM(s) Oral every 3 hours PRN Moderate to Severe Pain (4-10)  oxyCODONE    IR 5 milliGRAM(s) Oral once PRN Moderate to Severe Pain (4-10)  pramoxine 1%/zinc 5% Cream 1 Application(s) Topical every 4 hours PRN Moderate Pain (4-6)  simethicone 80 milliGRAM(s) Chew every 4 hours PRN Gas  witch hazel Pads 1 Application(s) Topical every 4 hours PRN Perineal discomfort      Labs:  Blood type: O Positive  Rubella IgG: RPR: Negative                          12.4   17.60<H> >-----------< 289    (  @ 06:15 )             37.9                        12.3   21.39<H> >-----------< 238    (  @ 06:10 )             37.5                        11.5   20.52<H> >-----------< 199    (  @ 08:38 )             34.5                        11.4<L>   16.18<H> >-----------< 194    (  @ 20:40 )             34.9                        13.2   14.62<H> >-----------< 201    (  @ 13:00 )             40.2    23 @ 06:10      139  |  104  |  8   ----------------------------<  71  4.1   |  22  |  0.69    23 @ 08:38      138  |  103  |  6<L>  ----------------------------<  96  3.7   |  22  |  0.76    23 @ 13:00      139  |  106  |  8   ----------------------------<  75  4.0   |  19<L>  |  0.72        Ca    9.1      18 May 2023 06:10  Ca    8.0<L>      17 May 2023 08:38  Ca    9.2      16 May 2023 13:00  Mg     4.80<H>       Mg     4.80<H>       Mg     5.10<H>         TPro  6.3  /  Alb  3.5  /  TBili  0.2  /  DBili  x   /  AST  33<H>  /  ALT  72<H>  /  AlkPhos  160<H>  23 @ 06:10  TPro  5.8<L>  /  Alb  3.1<L>  /  TBili  0.3  /  DBili  x   /  AST  40<H>  /  ALT  80<H>  /  AlkPhos  166<H>  23 @ 08:38  TPro  6.0  /  Alb  3.3  /  TBili  0.3  /  DBili  x   /  AST  43<H>  /  ALT  97<H>  /  AlkPhos  197<H>  23 @ 13:00          
Post-partum Note,   She is a  28y woman who is now post-partum day: 3    Subjective:  The patient feels well.  She is ambulating.   She is tolerating regular diet.  She denies nausea and vomiting; denies fever.  She is voiding.  Her pain is controlled.  She reports normal postpartum bleeding.  She is breastfeeding and formula feeding.  She denies HA, blurry vision, epigastric pain, SOB, CP, dizziness or lightheadedness    Physical exam:    Vital Signs Last 24 Hrs  T(C): 36.9 (19 May 2023 09:44), Max: 37.2 (18 May 2023 18:15)  T(F): 98.4 (19 May 2023 09:44), Max: 98.9 (18 May 2023 18:15)  HR: 80 (19 May 2023 09:44) (60 - 96)  BP: 94/68 (19 May 2023 09:44) (94/68 - 119/74)  BP(mean): --  RR: 16 (19 May 2023 09:44) (16 - 18)  SpO2: 100% (19 May 2023 09:44) (99% - 100%)    Parameters below as of 19 May 2023 09:44  Patient On (Oxygen Delivery Method): room air        Gen: NAD  Breast: Soft, nontender, not engorged.  Abdomen: Soft, nontender, no distension , firm uterine fundus at umbilicus.  Pelvic: Normal lochia noted  Ext: No calf tenderness    LABS:                        12.4   17.60 )-----------( 289      ( 19 May 2023 06:15 )             37.9       Allergies    No Known Allergies        MEDICATIONS  (STANDING):  acetaminophen     Tablet .. 975 milliGRAM(s) Oral <User Schedule>  diphtheria/tetanus/pertussis (acellular) Vaccine (Adacel) 0.5 milliLiter(s) IntraMuscular once  docosanol 10% Cream 1 Application(s) Topical five times a day  ertapenem  IVPB 1000 milliGRAM(s) IV Intermittent every 24 hours  ibuprofen  Tablet. 600 milliGRAM(s) Oral every 6 hours  NIFEdipine XL 30 milliGRAM(s) Oral daily  prenatal multivitamin 1 Tablet(s) Oral daily  sodium chloride 0.9% lock flush 3 milliLiter(s) IV Push every 8 hours  valACYclovir 1000 milliGRAM(s) Oral every 12 hours    MEDICATIONS  (PRN):  benzocaine 20%/menthol 0.5% Spray 1 Spray(s) Topical every 6 hours PRN for Perineal discomfort  dibucaine 1% Ointment 1 Application(s) Topical every 6 hours PRN Perineal discomfort  diphenhydrAMINE 25 milliGRAM(s) Oral every 6 hours PRN Pruritus  hydrocortisone 1% Cream 1 Application(s) Topical every 6 hours PRN Moderate Pain (4-6)  lanolin Ointment 1 Application(s) Topical every 6 hours PRN nipple soreness  magnesium hydroxide Suspension 30 milliLiter(s) Oral two times a day PRN Constipation  oxyCODONE    IR 5 milliGRAM(s) Oral every 3 hours PRN Moderate to Severe Pain (4-10)  oxyCODONE    IR 5 milliGRAM(s) Oral once PRN Moderate to Severe Pain (4-10)  pramoxine 1%/zinc 5% Cream 1 Application(s) Topical every 4 hours PRN Moderate Pain (4-6)  simethicone 80 milliGRAM(s) Chew every 4 hours PRN Gas  witch hazel Pads 1 Application(s) Topical every 4 hours PRN Perineal discomfort

## 2023-05-19 NOTE — PROGRESS NOTE ADULT - ASSESSMENT
A/P: 27yo PPD#2 s/p  with immediate postpartum course c/b development of preeclampsia with severe features. Patient was initially induced for transaminitis of unknown origin with no hypertensive disorder diagnosed in her prenatal course. Patient is stable and doing well post-partum.     #Preeclampsia with severe features  - BPs overnight wnl  - AST/ALT: 45/112->42/103  - S/p Hydralazine 5mg IVP ()  - Continue Procardia 30XL daily  - S/p Mg for seizure prophylaxis    #PP care  - Pain well controlled, continue current pain regimen  - Increase ambulation, SCDs when not ambulating  - Continue regular diet  - Discharge planning per private attending    Jackie Dubois, PGY1
A/P: 29yo PPD#1 s/p  with immediate postpartum course c/b development of preeclampsia with severe features. Patient was initially induced for transaminitis of unknown origin with no hypertensive disorder diagnosed in her prenatal course. Patient is stable and doing well post-partum.     #Preeclampsia with severe features  - BPs overnight wnl  - AST/ALT: 45/112->42/103  - S/p Hydralazine 5mg IVP ()  - Continue Mg for seizure prophylaxis until 230p today  - Continue Procardia 30XL daily    #PP care  - Pain well controlled, continue current pain regimen  - Increase ambulation, SCDs when not ambulating  - Continue regular diet    Jackie Dubois, PGY1
A/P: 29yo PPD#3 s/p  with immediate postpartum course c/b development of preeclampsia with severe features. Patient was initially induced for transaminitis of unknown origin with no hypertensive disorder diagnosed in her prenatal course. 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 Patient is stable and doing well post-partum.     #Endometritis  - s/p Invanz x1  - WBC downtrending 21.3->17.6  - afebrile     #HSV-1 Lesion  - Valtrex ordered  - Abreva ordered for symptomatic use  - Counseled on proper care; encouraged to not put mouth on . Also instructed to wash hands frequently   - d/w Peds resident who also provided counseling     #Preeclampsia with severe features  - BPs overnight wnl  - AST/ALT: 45/112->42/103->>40/80, downtrending   - S/p Hydralazine 5mg IVP ()  - Continue Procardia 30XL daily  - S/p Mg for seizure prophylaxis    #Routine Postpartum Care  - Continue with PO analgesia  - Increase ambulation, SCDs when not ambulating  - Continue regular diet  - Discharge planning       Krupa Siu, PGY1  
Assessment and Plan  PPD #3 s/p   Doing well, bonding with baby, good support at bedside  PEC w/ SF  ·	LFT's downtrending  ·	VSS without medication  ·	s/p mag  ·	pt to continue checking bp at home TID, keep logs, bring to each visit, parameters reviewed  ·	PEC precautions reviewed  Leukocytocis  ·	WBC's downtrending  ·	VSS  ·	s/p Invanz x1   Cold sores  ·	continue Abreva and valtrex as prescribed- Rx sent to at home pharmacy  ·	HSV counseling  Encourage ambulation.  PP & PPD Instructions reviewed.  CPC.  D/C home today  RTO within 1 week for bp check/PRN

## 2023-05-22 ENCOUNTER — NON-APPOINTMENT (OUTPATIENT)
Age: 28
End: 2023-05-22

## 2023-05-22 RX ORDER — PRENATAL VIT NO.126/IRON/FOLIC 28MG-0.8MG
28-0.8 TABLET ORAL DAILY
Refills: 0 | Status: ACTIVE | COMMUNITY
Start: 2023-05-22

## 2023-05-24 ENCOUNTER — NON-APPOINTMENT (OUTPATIENT)
Age: 28
End: 2023-05-24

## 2023-05-30 ENCOUNTER — NON-APPOINTMENT (OUTPATIENT)
Age: 28
End: 2023-05-30

## 2023-06-06 ENCOUNTER — NON-APPOINTMENT (OUTPATIENT)
Age: 28
End: 2023-06-06

## 2023-06-07 DIAGNOSIS — Z78.9 OTHER SPECIFIED HEALTH STATUS: ICD-10-CM

## 2023-06-07 RX ORDER — NIFEDIPINE 30 MG/1
30 TABLET, FILM COATED, EXTENDED RELEASE ORAL DAILY
Refills: 0 | Status: DISCONTINUED | COMMUNITY
Start: 2023-05-22 | End: 2023-06-07

## 2023-06-13 ENCOUNTER — APPOINTMENT (OUTPATIENT)
Dept: CARDIOLOGY | Facility: CLINIC | Age: 28
End: 2023-06-13
Payer: COMMERCIAL

## 2023-06-13 ENCOUNTER — NON-APPOINTMENT (OUTPATIENT)
Age: 28
End: 2023-06-13

## 2023-06-13 VITALS
DIASTOLIC BLOOD PRESSURE: 68 MMHG | WEIGHT: 113 LBS | HEIGHT: 62 IN | HEART RATE: 87 BPM | SYSTOLIC BLOOD PRESSURE: 100 MMHG | OXYGEN SATURATION: 97 % | BODY MASS INDEX: 20.8 KG/M2

## 2023-06-13 DIAGNOSIS — O14.90 UNSPECIFIED PRE-ECLAMPSIA, UNSPECIFIED TRIMESTER: ICD-10-CM

## 2023-06-13 PROCEDURE — 99203 OFFICE O/P NEW LOW 30 MIN: CPT | Mod: 25

## 2023-06-13 PROCEDURE — 93000 ELECTROCARDIOGRAM COMPLETE: CPT

## 2023-06-13 NOTE — HISTORY OF PRESENT ILLNESS
[FreeTextEntry1] : 28 year old woman  who delivered on 23 at 38.1 weeks with course complicated by PEC.\par She was given RX to take med at home but she has been normotensive with SBP ~110 since delivery.

## 2023-06-13 NOTE — DISCUSSION/SUMMARY
[EKG obtained to assist in diagnosis and management of assessed problem(s)] : EKG obtained to assist in diagnosis and management of assessed problem(s) [FreeTextEntry1] : 28 year old woman  who delivered on 23 at 38.1 weeks with course complicated by PEC.\par She was given RX to take med at home but she has been normotensive with SBP ~110 since delivery.\par Continue to monitor for 1-2 weeks\par Watch salt intake\par FU as needed

## 2023-06-14 ENCOUNTER — NON-APPOINTMENT (OUTPATIENT)
Age: 28
End: 2023-06-14
